# Patient Record
Sex: FEMALE | Race: WHITE | NOT HISPANIC OR LATINO | Employment: OTHER | URBAN - METROPOLITAN AREA
[De-identification: names, ages, dates, MRNs, and addresses within clinical notes are randomized per-mention and may not be internally consistent; named-entity substitution may affect disease eponyms.]

---

## 2017-03-07 ENCOUNTER — COMMUNICATION - HEALTHEAST (OUTPATIENT)
Dept: INTERNAL MEDICINE | Facility: CLINIC | Age: 77
End: 2017-03-07

## 2017-04-06 ENCOUNTER — OFFICE VISIT - HEALTHEAST (OUTPATIENT)
Dept: INTERNAL MEDICINE | Facility: CLINIC | Age: 77
End: 2017-04-06

## 2017-04-06 DIAGNOSIS — J47.9 BRONCHIECTASIS WITHOUT COMPLICATION (H): ICD-10-CM

## 2017-04-06 DIAGNOSIS — Z86.59 HISTORY OF SCHIZOPHRENIA: ICD-10-CM

## 2017-04-06 DIAGNOSIS — E55.9 VITAMIN D DEFICIENCY: ICD-10-CM

## 2017-04-06 DIAGNOSIS — E78.5 HYPERLIPEMIA: ICD-10-CM

## 2017-04-06 LAB — LDLC SERPL CALC-MCNC: 131 MG/DL

## 2017-04-06 ASSESSMENT — MIFFLIN-ST. JEOR: SCORE: 945.22

## 2017-04-08 ENCOUNTER — COMMUNICATION - HEALTHEAST (OUTPATIENT)
Dept: INTERNAL MEDICINE | Facility: CLINIC | Age: 77
End: 2017-04-08

## 2017-07-21 ENCOUNTER — COMMUNICATION - HEALTHEAST (OUTPATIENT)
Dept: INTERNAL MEDICINE | Facility: CLINIC | Age: 77
End: 2017-07-21

## 2017-09-05 ENCOUNTER — COMMUNICATION - HEALTHEAST (OUTPATIENT)
Dept: INTERNAL MEDICINE | Facility: CLINIC | Age: 77
End: 2017-09-05

## 2017-09-06 ENCOUNTER — COMMUNICATION - HEALTHEAST (OUTPATIENT)
Dept: INTERNAL MEDICINE | Facility: CLINIC | Age: 77
End: 2017-09-06

## 2017-09-06 DIAGNOSIS — R05.9 COUGH: ICD-10-CM

## 2017-09-22 ENCOUNTER — COMMUNICATION - HEALTHEAST (OUTPATIENT)
Dept: INTERNAL MEDICINE | Facility: CLINIC | Age: 77
End: 2017-09-22

## 2017-09-22 DIAGNOSIS — E78.5 HYPERLIPEMIA: ICD-10-CM

## 2017-09-22 DIAGNOSIS — E53.8 VITAMIN B12 DEFICIENCY: ICD-10-CM

## 2017-09-22 DIAGNOSIS — E55.9 VITAMIN D DEFICIENCY: ICD-10-CM

## 2017-09-29 ENCOUNTER — AMBULATORY - HEALTHEAST (OUTPATIENT)
Dept: LAB | Facility: CLINIC | Age: 77
End: 2017-09-29

## 2017-09-29 DIAGNOSIS — E53.8 VITAMIN B12 DEFICIENCY: ICD-10-CM

## 2017-09-29 DIAGNOSIS — E78.5 HYPERLIPEMIA: ICD-10-CM

## 2017-09-29 DIAGNOSIS — E55.9 VITAMIN D DEFICIENCY: ICD-10-CM

## 2017-09-29 LAB
CHOLEST SERPL-MCNC: 234 MG/DL
FASTING STATUS PATIENT QL REPORTED: YES
HDLC SERPL-MCNC: 69 MG/DL
LDLC SERPL CALC-MCNC: 140 MG/DL
TRIGL SERPL-MCNC: 127 MG/DL

## 2017-10-05 ENCOUNTER — OFFICE VISIT - HEALTHEAST (OUTPATIENT)
Dept: INTERNAL MEDICINE | Facility: CLINIC | Age: 77
End: 2017-10-05

## 2017-10-05 DIAGNOSIS — J47.9 BRONCHIECTASIS WITHOUT COMPLICATION (H): ICD-10-CM

## 2017-10-05 DIAGNOSIS — Z00.00 HEALTH MAINTENANCE EXAMINATION: ICD-10-CM

## 2017-10-05 DIAGNOSIS — E55.9 VITAMIN D DEFICIENCY: ICD-10-CM

## 2017-10-05 DIAGNOSIS — K64.9 HEMORRHOIDS: ICD-10-CM

## 2017-10-05 ASSESSMENT — MIFFLIN-ST. JEOR: SCORE: 938.75

## 2017-10-10 ENCOUNTER — COMMUNICATION - HEALTHEAST (OUTPATIENT)
Dept: INTERNAL MEDICINE | Facility: CLINIC | Age: 77
End: 2017-10-10

## 2017-10-10 ENCOUNTER — COMMUNICATION - HEALTHEAST (OUTPATIENT)
Dept: SCHEDULING | Facility: CLINIC | Age: 77
End: 2017-10-10

## 2017-10-10 DIAGNOSIS — K64.9 HEMORRHOIDS: ICD-10-CM

## 2017-10-10 DIAGNOSIS — K62.5 RECTAL BLEEDING: ICD-10-CM

## 2017-11-20 ENCOUNTER — COMMUNICATION - HEALTHEAST (OUTPATIENT)
Dept: INTERNAL MEDICINE | Facility: CLINIC | Age: 77
End: 2017-11-20

## 2017-11-20 DIAGNOSIS — F20.9 SCHIZOPHRENIA (H): ICD-10-CM

## 2017-11-29 ENCOUNTER — COMMUNICATION - HEALTHEAST (OUTPATIENT)
Dept: INTERNAL MEDICINE | Facility: CLINIC | Age: 77
End: 2017-11-29

## 2017-12-01 ENCOUNTER — OFFICE VISIT - HEALTHEAST (OUTPATIENT)
Dept: INTERNAL MEDICINE | Facility: CLINIC | Age: 77
End: 2017-12-01

## 2017-12-01 DIAGNOSIS — R07.9 CHEST PAIN: ICD-10-CM

## 2017-12-01 DIAGNOSIS — Z86.59 HISTORY OF SCHIZOPHRENIA: ICD-10-CM

## 2017-12-01 DIAGNOSIS — Z23 NEED FOR PNEUMOCOCCAL VACCINATION: ICD-10-CM

## 2017-12-01 DIAGNOSIS — J18.9 PNEUMONIA DUE TO INFECTIOUS ORGANISM, UNSPECIFIED LATERALITY, UNSPECIFIED PART OF LUNG: ICD-10-CM

## 2017-12-03 ENCOUNTER — COMMUNICATION - HEALTHEAST (OUTPATIENT)
Dept: INTERNAL MEDICINE | Facility: CLINIC | Age: 77
End: 2017-12-03

## 2017-12-05 ENCOUNTER — RECORDS - HEALTHEAST (OUTPATIENT)
Dept: ADMINISTRATIVE | Facility: OTHER | Age: 77
End: 2017-12-05

## 2018-01-29 ENCOUNTER — COMMUNICATION - HEALTHEAST (OUTPATIENT)
Dept: INTERNAL MEDICINE | Facility: CLINIC | Age: 78
End: 2018-01-29

## 2018-02-26 ENCOUNTER — OFFICE VISIT - HEALTHEAST (OUTPATIENT)
Dept: BEHAVIORAL HEALTH | Facility: CLINIC | Age: 78
End: 2018-02-26

## 2018-02-26 DIAGNOSIS — F20.9 SCHIZOPHRENIA, UNSPECIFIED TYPE (H): ICD-10-CM

## 2018-02-26 DIAGNOSIS — Z86.59 HISTORY OF SCHIZOPHRENIA: ICD-10-CM

## 2018-04-03 ENCOUNTER — OFFICE VISIT - HEALTHEAST (OUTPATIENT)
Dept: BEHAVIORAL HEALTH | Facility: CLINIC | Age: 78
End: 2018-04-03

## 2018-04-03 DIAGNOSIS — F20.0 PARANOID SCHIZOPHRENIA (H): ICD-10-CM

## 2018-04-03 ASSESSMENT — MIFFLIN-ST. JEOR: SCORE: 953.49

## 2018-05-09 ENCOUNTER — OFFICE VISIT - HEALTHEAST (OUTPATIENT)
Dept: INTERNAL MEDICINE | Facility: CLINIC | Age: 78
End: 2018-05-09

## 2018-05-09 DIAGNOSIS — J47.9 BRONCHIECTASIS WITHOUT COMPLICATION (H): ICD-10-CM

## 2018-05-09 DIAGNOSIS — F99 MENTAL HEALTH DISORDER: ICD-10-CM

## 2018-05-09 DIAGNOSIS — R53.1 ASTHENIA: ICD-10-CM

## 2018-05-16 ENCOUNTER — COMMUNICATION - HEALTHEAST (OUTPATIENT)
Dept: INTERNAL MEDICINE | Facility: CLINIC | Age: 78
End: 2018-05-16

## 2018-05-30 ENCOUNTER — COMMUNICATION - HEALTHEAST (OUTPATIENT)
Dept: INTERNAL MEDICINE | Facility: CLINIC | Age: 78
End: 2018-05-30

## 2018-07-05 ENCOUNTER — OFFICE VISIT - HEALTHEAST (OUTPATIENT)
Dept: BEHAVIORAL HEALTH | Facility: CLINIC | Age: 78
End: 2018-07-05

## 2018-07-05 DIAGNOSIS — F20.0 PARANOID SCHIZOPHRENIA (H): ICD-10-CM

## 2018-08-25 ENCOUNTER — COMMUNICATION - HEALTHEAST (OUTPATIENT)
Dept: INTERNAL MEDICINE | Facility: CLINIC | Age: 78
End: 2018-08-25

## 2018-08-25 DIAGNOSIS — J47.9 BRONCHIECTASIS WITHOUT COMPLICATION (H): ICD-10-CM

## 2018-12-13 ENCOUNTER — COMMUNICATION - HEALTHEAST (OUTPATIENT)
Dept: INTERNAL MEDICINE | Facility: CLINIC | Age: 78
End: 2018-12-13

## 2018-12-13 DIAGNOSIS — Z00.00 ANNUAL PHYSICAL EXAM: ICD-10-CM

## 2018-12-13 DIAGNOSIS — E55.9 VITAMIN D DEFICIENCY: ICD-10-CM

## 2018-12-13 DIAGNOSIS — E78.5 HYPERLIPEMIA: ICD-10-CM

## 2018-12-14 ENCOUNTER — OFFICE VISIT - HEALTHEAST (OUTPATIENT)
Dept: INTERNAL MEDICINE | Facility: CLINIC | Age: 78
End: 2018-12-14

## 2018-12-14 DIAGNOSIS — E55.9 VITAMIN D DEFICIENCY: ICD-10-CM

## 2018-12-14 DIAGNOSIS — Z00.00 HEALTH CARE MAINTENANCE: ICD-10-CM

## 2018-12-14 DIAGNOSIS — Z86.59 HISTORY OF SCHIZOPHRENIA: ICD-10-CM

## 2018-12-14 DIAGNOSIS — E78.5 HYPERLIPIDEMIA, UNSPECIFIED HYPERLIPIDEMIA TYPE: ICD-10-CM

## 2018-12-14 DIAGNOSIS — F99 MENTAL HEALTH DISORDER: ICD-10-CM

## 2018-12-14 LAB
ALBUMIN SERPL-MCNC: 4 G/DL (ref 3.5–5)
ALP SERPL-CCNC: 59 U/L (ref 45–120)
ALT SERPL W P-5'-P-CCNC: 19 U/L (ref 0–45)
ANION GAP SERPL CALCULATED.3IONS-SCNC: 7 MMOL/L (ref 5–18)
AST SERPL W P-5'-P-CCNC: 19 U/L (ref 0–40)
BILIRUB SERPL-MCNC: 0.4 MG/DL (ref 0–1)
BUN SERPL-MCNC: 15 MG/DL (ref 8–28)
CALCIUM SERPL-MCNC: 10.2 MG/DL (ref 8.5–10.5)
CHLORIDE BLD-SCNC: 104 MMOL/L (ref 98–107)
CHOLEST SERPL-MCNC: 219 MG/DL
CO2 SERPL-SCNC: 29 MMOL/L (ref 22–31)
CREAT SERPL-MCNC: 0.73 MG/DL (ref 0.6–1.1)
ERYTHROCYTE [DISTWIDTH] IN BLOOD BY AUTOMATED COUNT: 11.2 % (ref 11–14.5)
FASTING STATUS PATIENT QL REPORTED: NO
GFR SERPL CREATININE-BSD FRML MDRD: >60 ML/MIN/1.73M2
GLUCOSE BLD-MCNC: 81 MG/DL (ref 70–125)
HCT VFR BLD AUTO: 40.2 % (ref 35–47)
HDLC SERPL-MCNC: 68 MG/DL
HGB BLD-MCNC: 13.5 G/DL (ref 12–16)
LDLC SERPL CALC-MCNC: 137 MG/DL
MCH RBC QN AUTO: 30.6 PG (ref 27–34)
MCHC RBC AUTO-ENTMCNC: 33.6 G/DL (ref 32–36)
MCV RBC AUTO: 91 FL (ref 80–100)
PLATELET # BLD AUTO: 237 THOU/UL (ref 140–440)
PMV BLD AUTO: 7.4 FL (ref 7–10)
POTASSIUM BLD-SCNC: 4.7 MMOL/L (ref 3.5–5)
PROT SERPL-MCNC: 6.5 G/DL (ref 6–8)
RBC # BLD AUTO: 4.42 MILL/UL (ref 3.8–5.4)
SODIUM SERPL-SCNC: 140 MMOL/L (ref 136–145)
TRIGL SERPL-MCNC: 72 MG/DL
TSH SERPL DL<=0.005 MIU/L-ACNC: 1.1 UIU/ML (ref 0.3–5)
WBC: 5.2 THOU/UL (ref 4–11)

## 2018-12-14 ASSESSMENT — MIFFLIN-ST. JEOR: SCORE: 1023.81

## 2018-12-17 ENCOUNTER — COMMUNICATION - HEALTHEAST (OUTPATIENT)
Dept: INTERNAL MEDICINE | Facility: CLINIC | Age: 78
End: 2018-12-17

## 2018-12-17 LAB — 25(OH)D3 SERPL-MCNC: 54 NG/ML (ref 30–80)

## 2018-12-19 ENCOUNTER — COMMUNICATION - HEALTHEAST (OUTPATIENT)
Dept: INTERNAL MEDICINE | Facility: CLINIC | Age: 78
End: 2018-12-19

## 2018-12-19 DIAGNOSIS — J47.9 BRONCHIECTASIS WITHOUT COMPLICATION (H): ICD-10-CM

## 2019-01-03 ENCOUNTER — HOSPITAL ENCOUNTER (OUTPATIENT)
Dept: CARDIOLOGY | Facility: CLINIC | Age: 79
Discharge: HOME | End: 2019-01-03
Attending: NURSE PRACTITIONER

## 2019-01-03 ENCOUNTER — OFFICE VISIT - HEALTHEAST (OUTPATIENT)
Dept: BEHAVIORAL HEALTH | Facility: CLINIC | Age: 79
End: 2019-01-03

## 2019-01-03 DIAGNOSIS — Z86.59 HISTORY OF SCHIZOPHRENIA: ICD-10-CM

## 2019-01-03 LAB
ATRIAL RATE - MUSE: 84 BPM
DIASTOLIC BLOOD PRESSURE - MUSE: NORMAL MMHG
INTERPRETATION ECG - MUSE: NORMAL
P AXIS - MUSE: 71 DEGREES
PR INTERVAL - MUSE: 148 MS
QRS DURATION - MUSE: 72 MS
QT - MUSE: 352 MS
QTC - MUSE: 415 MS
R AXIS - MUSE: 18 DEGREES
SYSTOLIC BLOOD PRESSURE - MUSE: NORMAL MMHG
T AXIS - MUSE: 45 DEGREES
VENTRICULAR RATE- MUSE: 84 BPM

## 2019-01-07 ENCOUNTER — COMMUNICATION - HEALTHEAST (OUTPATIENT)
Dept: BEHAVIORAL HEALTH | Facility: CLINIC | Age: 79
End: 2019-01-07

## 2019-02-01 ENCOUNTER — COMMUNICATION - HEALTHEAST (OUTPATIENT)
Dept: INTERNAL MEDICINE | Facility: CLINIC | Age: 79
End: 2019-02-01

## 2019-02-14 ENCOUNTER — RECORDS - HEALTHEAST (OUTPATIENT)
Dept: ADMINISTRATIVE | Facility: OTHER | Age: 79
End: 2019-02-14

## 2019-02-21 ENCOUNTER — COMMUNICATION - HEALTHEAST (OUTPATIENT)
Dept: INTERNAL MEDICINE | Facility: CLINIC | Age: 79
End: 2019-02-21

## 2019-02-21 DIAGNOSIS — J32.9 SINUSITIS, UNSPECIFIED CHRONICITY, UNSPECIFIED LOCATION: ICD-10-CM

## 2019-03-12 ENCOUNTER — COMMUNICATION - HEALTHEAST (OUTPATIENT)
Dept: SCHEDULING | Facility: CLINIC | Age: 79
End: 2019-03-12

## 2019-03-13 ENCOUNTER — OFFICE VISIT - HEALTHEAST (OUTPATIENT)
Dept: INTERNAL MEDICINE | Facility: CLINIC | Age: 79
End: 2019-03-13

## 2019-03-13 DIAGNOSIS — Z13.820 SCREENING FOR OSTEOPOROSIS: ICD-10-CM

## 2019-03-13 DIAGNOSIS — J47.9 BRONCHIECTASIS WITHOUT COMPLICATION (H): ICD-10-CM

## 2019-03-13 DIAGNOSIS — J20.9 ACUTE BRONCHITIS, UNSPECIFIED ORGANISM: ICD-10-CM

## 2019-03-13 ASSESSMENT — MIFFLIN-ST. JEOR: SCORE: 1025.62

## 2019-04-19 ENCOUNTER — COMMUNICATION - HEALTHEAST (OUTPATIENT)
Dept: SCHEDULING | Facility: CLINIC | Age: 79
End: 2019-04-19

## 2019-04-22 ENCOUNTER — COMMUNICATION - HEALTHEAST (OUTPATIENT)
Dept: BEHAVIORAL HEALTH | Facility: CLINIC | Age: 79
End: 2019-04-22

## 2019-04-25 ENCOUNTER — COMMUNICATION - HEALTHEAST (OUTPATIENT)
Dept: BEHAVIORAL HEALTH | Facility: CLINIC | Age: 79
End: 2019-04-25

## 2019-04-26 ENCOUNTER — OFFICE VISIT - HEALTHEAST (OUTPATIENT)
Dept: BEHAVIORAL HEALTH | Facility: CLINIC | Age: 79
End: 2019-04-26

## 2019-04-26 DIAGNOSIS — F20.0 PARANOID SCHIZOPHRENIA (H): ICD-10-CM

## 2019-04-26 DIAGNOSIS — Z86.59 HISTORY OF SCHIZOPHRENIA: ICD-10-CM

## 2019-04-26 ASSESSMENT — MIFFLIN-ST. JEOR: SCORE: 1037.41

## 2019-05-08 ENCOUNTER — COMMUNICATION - HEALTHEAST (OUTPATIENT)
Dept: BEHAVIORAL HEALTH | Facility: CLINIC | Age: 79
End: 2019-05-08

## 2019-05-08 ENCOUNTER — OFFICE VISIT - HEALTHEAST (OUTPATIENT)
Dept: INTERNAL MEDICINE | Facility: CLINIC | Age: 79
End: 2019-05-08

## 2019-05-08 DIAGNOSIS — R22.1 LUMP IN NECK: ICD-10-CM

## 2019-05-08 DIAGNOSIS — M81.0 OSTEOPOROSIS, UNSPECIFIED OSTEOPOROSIS TYPE, UNSPECIFIED PATHOLOGICAL FRACTURE PRESENCE: ICD-10-CM

## 2019-05-08 DIAGNOSIS — Z86.59 HISTORY OF SCHIZOPHRENIA: ICD-10-CM

## 2019-05-08 DIAGNOSIS — J47.9 BRONCHIECTASIS WITHOUT ACUTE EXACERBATION (H): ICD-10-CM

## 2019-05-08 ASSESSMENT — MIFFLIN-ST. JEOR: SCORE: 1019.27

## 2019-07-22 ENCOUNTER — HOSPITAL ENCOUNTER (OUTPATIENT)
Dept: CT IMAGING | Facility: CLINIC | Age: 79
Discharge: HOME | End: 2019-07-22
Attending: INTERNAL MEDICINE

## 2019-07-22 ENCOUNTER — OFFICE VISIT - HEALTHEAST (OUTPATIENT)
Dept: INTERNAL MEDICINE | Facility: CLINIC | Age: 79
End: 2019-07-22

## 2019-07-22 DIAGNOSIS — R22.1 NECK MASS: ICD-10-CM

## 2019-07-22 LAB
CREAT BLD-MCNC: 0.8 MG/DL (ref 0.6–1.1)
GFR SERPL CREATININE-BSD FRML MDRD: >60 ML/MIN/1.73M2

## 2019-07-22 ASSESSMENT — MIFFLIN-ST. JEOR: SCORE: 1019.27

## 2019-07-30 ENCOUNTER — OFFICE VISIT - HEALTHEAST (OUTPATIENT)
Dept: OTOLARYNGOLOGY | Facility: CLINIC | Age: 79
End: 2019-07-30

## 2019-07-30 DIAGNOSIS — R22.1 NECK MASS: ICD-10-CM

## 2019-09-10 ENCOUNTER — OFFICE VISIT - HEALTHEAST (OUTPATIENT)
Dept: BEHAVIORAL HEALTH | Facility: CLINIC | Age: 79
End: 2019-09-10

## 2019-09-10 DIAGNOSIS — F20.0 PARANOID SCHIZOPHRENIA (H): ICD-10-CM

## 2019-09-28 ENCOUNTER — COMMUNICATION - HEALTHEAST (OUTPATIENT)
Dept: SCHEDULING | Facility: CLINIC | Age: 79
End: 2019-09-28

## 2019-09-28 ENCOUNTER — COMMUNICATION - HEALTHEAST (OUTPATIENT)
Dept: PEDIATRICS | Facility: CLINIC | Age: 79
End: 2019-09-28

## 2019-09-28 DIAGNOSIS — J47.9 BRONCHIECTASIS WITHOUT COMPLICATION (H): ICD-10-CM

## 2019-10-17 ENCOUNTER — OFFICE VISIT - HEALTHEAST (OUTPATIENT)
Dept: INTERNAL MEDICINE | Facility: CLINIC | Age: 79
End: 2019-10-17

## 2019-10-17 DIAGNOSIS — M81.0 AGE RELATED OSTEOPOROSIS, UNSPECIFIED PATHOLOGICAL FRACTURE PRESENCE: ICD-10-CM

## 2019-10-17 DIAGNOSIS — J47.9 BRONCHIECTASIS WITHOUT ACUTE EXACERBATION (H): ICD-10-CM

## 2019-10-17 DIAGNOSIS — Z12.11 SCREENING FOR COLON CANCER: ICD-10-CM

## 2019-10-17 ASSESSMENT — MIFFLIN-ST. JEOR: SCORE: 1010.19

## 2019-10-28 ENCOUNTER — RECORDS - HEALTHEAST (OUTPATIENT)
Dept: ADMINISTRATIVE | Facility: OTHER | Age: 79
End: 2019-10-28

## 2019-10-28 LAB — COLOGUARD-ABSTRACT: NEGATIVE

## 2019-11-08 ENCOUNTER — RECORDS - HEALTHEAST (OUTPATIENT)
Dept: HEALTH INFORMATION MANAGEMENT | Facility: CLINIC | Age: 79
End: 2019-11-08

## 2019-11-19 ENCOUNTER — AMBULATORY - HEALTHEAST (OUTPATIENT)
Dept: BEHAVIORAL HEALTH | Facility: CLINIC | Age: 79
End: 2019-11-19

## 2019-11-19 ENCOUNTER — RECORDS - HEALTHEAST (OUTPATIENT)
Dept: BONE DENSITY | Facility: CLINIC | Age: 79
End: 2019-11-19

## 2019-11-19 DIAGNOSIS — M81.0 AGE-RELATED OSTEOPOROSIS WITHOUT CURRENT PATHOLOGICAL FRACTURE: ICD-10-CM

## 2019-11-19 DIAGNOSIS — Z86.59 HISTORY OF SCHIZOPHRENIA: ICD-10-CM

## 2019-12-02 ENCOUNTER — COMMUNICATION - HEALTHEAST (OUTPATIENT)
Dept: INTERNAL MEDICINE | Facility: CLINIC | Age: 79
End: 2019-12-02

## 2020-01-17 ENCOUNTER — COMMUNICATION - HEALTHEAST (OUTPATIENT)
Dept: BEHAVIORAL HEALTH | Facility: CLINIC | Age: 80
End: 2020-01-17

## 2020-01-21 ENCOUNTER — AMBULATORY - HEALTHEAST (OUTPATIENT)
Dept: BEHAVIORAL HEALTH | Facility: CLINIC | Age: 80
End: 2020-01-21

## 2020-01-21 DIAGNOSIS — F20.0 PARANOID SCHIZOPHRENIA (H): ICD-10-CM

## 2020-02-04 ENCOUNTER — OFFICE VISIT - HEALTHEAST (OUTPATIENT)
Dept: BEHAVIORAL HEALTH | Facility: CLINIC | Age: 80
End: 2020-02-04

## 2020-02-04 DIAGNOSIS — F20.0 PARANOID SCHIZOPHRENIA (H): ICD-10-CM

## 2020-02-04 ASSESSMENT — MIFFLIN-ST. JEOR: SCORE: 1030.61

## 2020-02-14 ENCOUNTER — COMMUNICATION - HEALTHEAST (OUTPATIENT)
Dept: BEHAVIORAL HEALTH | Facility: CLINIC | Age: 80
End: 2020-02-14

## 2020-02-14 DIAGNOSIS — F20.0 PARANOID SCHIZOPHRENIA (H): ICD-10-CM

## 2020-03-23 ENCOUNTER — COMMUNICATION - HEALTHEAST (OUTPATIENT)
Dept: BEHAVIORAL HEALTH | Facility: CLINIC | Age: 80
End: 2020-03-23

## 2020-03-23 DIAGNOSIS — F20.0 PARANOID SCHIZOPHRENIA (H): ICD-10-CM

## 2020-05-26 ENCOUNTER — COMMUNICATION - HEALTHEAST (OUTPATIENT)
Dept: INTERNAL MEDICINE | Facility: CLINIC | Age: 80
End: 2020-05-26

## 2020-05-26 DIAGNOSIS — J47.9 BRONCHIECTASIS WITHOUT ACUTE EXACERBATION (H): ICD-10-CM

## 2020-06-02 ENCOUNTER — OFFICE VISIT - HEALTHEAST (OUTPATIENT)
Dept: BEHAVIORAL HEALTH | Facility: CLINIC | Age: 80
End: 2020-06-02

## 2020-06-02 DIAGNOSIS — Z86.59 HISTORY OF SCHIZOPHRENIA: ICD-10-CM

## 2020-07-21 ENCOUNTER — OFFICE VISIT - HEALTHEAST (OUTPATIENT)
Dept: INTERNAL MEDICINE | Facility: CLINIC | Age: 80
End: 2020-07-21

## 2020-07-21 DIAGNOSIS — J47.9 BRONCHIECTASIS WITHOUT ACUTE EXACERBATION (H): ICD-10-CM

## 2020-08-24 ENCOUNTER — COMMUNICATION - HEALTHEAST (OUTPATIENT)
Dept: BEHAVIORAL HEALTH | Facility: CLINIC | Age: 80
End: 2020-08-24

## 2020-08-24 DIAGNOSIS — F20.0 PARANOID SCHIZOPHRENIA (H): ICD-10-CM

## 2020-09-17 ENCOUNTER — TRANSFERRED RECORDS (OUTPATIENT)
Dept: HEALTH INFORMATION MANAGEMENT | Facility: CLINIC | Age: 80
End: 2020-09-17

## 2020-09-22 ENCOUNTER — OFFICE VISIT - HEALTHEAST (OUTPATIENT)
Dept: INTERNAL MEDICINE | Facility: CLINIC | Age: 80
End: 2020-09-22

## 2020-09-22 DIAGNOSIS — A31.0 MYCOBACTERIUM AVIUM INFECTION (H): ICD-10-CM

## 2020-09-22 DIAGNOSIS — Z00.00 ROUTINE GENERAL MEDICAL EXAMINATION AT A HEALTH CARE FACILITY: ICD-10-CM

## 2020-09-22 DIAGNOSIS — H53.9 VISUAL DISTURBANCE: ICD-10-CM

## 2020-09-22 DIAGNOSIS — R76.11 MANTOUX: POSITIVE: ICD-10-CM

## 2020-09-22 DIAGNOSIS — M81.0 OSTEOPOROSIS, UNSPECIFIED OSTEOPOROSIS TYPE, UNSPECIFIED PATHOLOGICAL FRACTURE PRESENCE: ICD-10-CM

## 2020-09-22 DIAGNOSIS — J47.9 BRONCHIECTASIS WITHOUT COMPLICATION (H): ICD-10-CM

## 2020-09-22 DIAGNOSIS — Z13.220 SCREENING FOR HYPERLIPIDEMIA: ICD-10-CM

## 2020-09-22 LAB
ALBUMIN SERPL-MCNC: 3.9 G/DL (ref 3.5–5)
ALP SERPL-CCNC: 61 U/L (ref 45–120)
ALT SERPL W P-5'-P-CCNC: 18 U/L (ref 0–45)
ANION GAP SERPL CALCULATED.3IONS-SCNC: 6 MMOL/L (ref 5–18)
AST SERPL W P-5'-P-CCNC: 20 U/L (ref 0–40)
BASOPHILS # BLD AUTO: 0 THOU/UL (ref 0–0.2)
BASOPHILS NFR BLD AUTO: 1 % (ref 0–2)
BILIRUB SERPL-MCNC: 0.4 MG/DL (ref 0–1)
BUN SERPL-MCNC: 11 MG/DL (ref 8–28)
CALCIUM SERPL-MCNC: 9.3 MG/DL (ref 8.5–10.5)
CHLORIDE BLD-SCNC: 104 MMOL/L (ref 98–107)
CHOLEST SERPL-MCNC: 230 MG/DL
CO2 SERPL-SCNC: 29 MMOL/L (ref 22–31)
CREAT SERPL-MCNC: 0.77 MG/DL (ref 0.6–1.1)
EOSINOPHIL # BLD AUTO: 0.1 THOU/UL (ref 0–0.4)
EOSINOPHIL NFR BLD AUTO: 2 % (ref 0–6)
ERYTHROCYTE [DISTWIDTH] IN BLOOD BY AUTOMATED COUNT: 11.5 % (ref 11–14.5)
FASTING STATUS PATIENT QL REPORTED: YES
GFR SERPL CREATININE-BSD FRML MDRD: >60 ML/MIN/1.73M2
GLUCOSE BLD-MCNC: 100 MG/DL (ref 70–125)
HCT VFR BLD AUTO: 40.4 % (ref 35–47)
HDLC SERPL-MCNC: 56 MG/DL
HGB BLD-MCNC: 13.6 G/DL (ref 12–16)
LDLC SERPL CALC-MCNC: 155 MG/DL
LYMPHOCYTES # BLD AUTO: 1.4 THOU/UL (ref 0.8–4.4)
LYMPHOCYTES NFR BLD AUTO: 28 % (ref 20–40)
MCH RBC QN AUTO: 31 PG (ref 27–34)
MCHC RBC AUTO-ENTMCNC: 33.7 G/DL (ref 32–36)
MCV RBC AUTO: 92 FL (ref 80–100)
MONOCYTES # BLD AUTO: 0.3 THOU/UL (ref 0–0.9)
MONOCYTES NFR BLD AUTO: 6 % (ref 2–10)
NEUTROPHILS # BLD AUTO: 3.3 THOU/UL (ref 2–7.7)
NEUTROPHILS NFR BLD AUTO: 64 % (ref 50–70)
PLATELET # BLD AUTO: 268 THOU/UL (ref 140–440)
PMV BLD AUTO: 7.6 FL (ref 7–10)
POTASSIUM BLD-SCNC: 4.3 MMOL/L (ref 3.5–5)
PROT SERPL-MCNC: 6.6 G/DL (ref 6–8)
RBC # BLD AUTO: 4.39 MILL/UL (ref 3.8–5.4)
SODIUM SERPL-SCNC: 139 MMOL/L (ref 136–145)
TRIGL SERPL-MCNC: 95 MG/DL
WBC: 5.1 THOU/UL (ref 4–11)

## 2020-09-22 ASSESSMENT — MIFFLIN-ST. JEOR: SCORE: 1023.81

## 2020-09-23 ENCOUNTER — COMMUNICATION - HEALTHEAST (OUTPATIENT)
Dept: INTERNAL MEDICINE | Facility: CLINIC | Age: 80
End: 2020-09-23

## 2020-10-06 ENCOUNTER — OFFICE VISIT - HEALTHEAST (OUTPATIENT)
Dept: BEHAVIORAL HEALTH | Facility: CLINIC | Age: 80
End: 2020-10-06

## 2020-10-06 DIAGNOSIS — F20.0 PARANOID SCHIZOPHRENIA (H): ICD-10-CM

## 2020-10-21 ENCOUNTER — ANCILLARY PROCEDURE (OUTPATIENT)
Dept: MRI IMAGING | Facility: CLINIC | Age: 80
End: 2020-10-21
Payer: MEDICARE

## 2020-10-21 DIAGNOSIS — H52.7 REFRACTIVE ERROR: ICD-10-CM

## 2020-10-21 DIAGNOSIS — H43.393 VITREOUS FLOATER, BILATERAL: ICD-10-CM

## 2020-10-21 PROCEDURE — 70553 MRI BRAIN STEM W/O & W/DYE: CPT | Performed by: RADIOLOGY

## 2020-10-21 PROCEDURE — A9585 GADOBUTROL INJECTION: HCPCS | Performed by: RADIOLOGY

## 2020-10-21 PROCEDURE — 70543 MRI ORBT/FAC/NCK W/O &W/DYE: CPT | Performed by: RADIOLOGY

## 2020-10-21 PROCEDURE — 99207 MRV BRAIN  WO & W CONTRAST: CPT | Performed by: RADIOLOGY

## 2020-10-21 RX ORDER — GADOBUTROL 604.72 MG/ML
7.5 INJECTION INTRAVENOUS ONCE
Status: COMPLETED | OUTPATIENT
Start: 2020-10-21 | End: 2020-10-21

## 2020-10-21 RX ADMIN — GADOBUTROL 7.5 ML: 604.72 INJECTION INTRAVENOUS at 11:20

## 2020-10-21 NOTE — DISCHARGE INSTRUCTIONS
MRI Contrast Discharge Instructions    The IV contrast you received today will pass out of your body in your  urine. This will happen in the next 24 hours. You will not feel this process.  Your urine will not change color.    Drink at least 4 extra glasses of water or juice today (unless your doctor  has restricted your fluids). This reduces the stress on your kidneys.  You may take your regular medicines.    If you are on dialysis: It is best to have dialysis today.    If you have a reaction: Most reactions happen right away. If you have  any new symptoms after leaving the hospital (such as hives or swelling),  call your hospital at the correct number below. Or call your family doctor.  If you have breathing distress or wheezing, call 911.    Special instructions: ***    I have read and understand the above information.    Signature:______________________________________ Date:___________    Staff:__________________________________________ Date:___________     Time:__________    [facility] Departments:    ___[place]: [phone]  ___[facility]: [phone]  ___[place]: [phone] ___[facility]: [phone]  ___[facility]: [phone]  ___[facility]: [phone]  ___[phone]  ___[facility]: [phone]  ___[phone]

## 2021-01-05 ENCOUNTER — OFFICE VISIT - HEALTHEAST (OUTPATIENT)
Dept: BEHAVIORAL HEALTH | Facility: CLINIC | Age: 81
End: 2021-01-05

## 2021-01-05 DIAGNOSIS — Z86.59 HISTORY OF SCHIZOPHRENIA: ICD-10-CM

## 2021-01-05 DIAGNOSIS — F20.0 PARANOID SCHIZOPHRENIA (H): ICD-10-CM

## 2021-01-11 ENCOUNTER — COMMUNICATION - HEALTHEAST (OUTPATIENT)
Dept: BEHAVIORAL HEALTH | Facility: CLINIC | Age: 81
End: 2021-01-11

## 2021-04-06 ENCOUNTER — OFFICE VISIT - HEALTHEAST (OUTPATIENT)
Dept: BEHAVIORAL HEALTH | Facility: CLINIC | Age: 81
End: 2021-04-06

## 2021-04-06 DIAGNOSIS — F43.22 ADJUSTMENT DISORDER WITH ANXIETY: ICD-10-CM

## 2021-04-06 DIAGNOSIS — Z79.899 HIGH RISK MEDICATION USE: ICD-10-CM

## 2021-04-06 DIAGNOSIS — Z86.59 HISTORY OF SCHIZOPHRENIA: ICD-10-CM

## 2021-04-06 DIAGNOSIS — H53.9 VISUAL DISTURBANCE: ICD-10-CM

## 2021-04-06 NOTE — ASSESSMENT & PLAN NOTE
"Family pressuring her to take care of glaucoma.  Had stressful 9 months with previous ladies. Current ladies are \"sweet\".  Moving out of [facility] [address] late May. Has own apt.  Reports mood stable.   "

## 2021-04-06 NOTE — ASSESSMENT & PLAN NOTE
"Pt reports: \"In Dec, God told me:  'Do not get treatment on your eyes'.   He wants me to go blind.  He is going to give me a gift.  I have glaucoma, but will not get it treated.\"  "

## 2021-04-06 NOTE — ASSESSMENT & PLAN NOTE
The risks and benefits of medication discussed with the pt including metabolic s/e's, TD, CV risk.  Today, pt endorsed involuntary movements of the mouth.  Says they are minimal.  She will ask her daughter to evaluate. Schedule in person DISCUS.

## 2021-04-12 ENCOUNTER — COMMUNICATION - HEALTHEAST (OUTPATIENT)
Dept: BEHAVIORAL HEALTH | Facility: CLINIC | Age: 81
End: 2021-04-12

## 2021-04-15 ENCOUNTER — COMMUNICATION - HEALTHEAST (OUTPATIENT)
Dept: BEHAVIORAL HEALTH | Facility: CLINIC | Age: 81
End: 2021-04-15

## 2021-04-19 ENCOUNTER — COMMUNICATION - HEALTHEAST (OUTPATIENT)
Dept: BEHAVIORAL HEALTH | Facility: CLINIC | Age: 81
End: 2021-04-19

## 2021-04-26 ENCOUNTER — COMMUNICATION - HEALTHEAST (OUTPATIENT)
Dept: BEHAVIORAL HEALTH | Facility: CLINIC | Age: 81
End: 2021-04-26

## 2021-04-27 ENCOUNTER — COMMUNICATION - HEALTHEAST (OUTPATIENT)
Dept: BEHAVIORAL HEALTH | Facility: CLINIC | Age: 81
End: 2021-04-27

## 2021-04-27 DIAGNOSIS — F20.0 PARANOID SCHIZOPHRENIA (H): ICD-10-CM

## 2021-05-28 NOTE — TELEPHONE ENCOUNTER
"PATIENT RETURNING OUR CALL REQUESTING TO SET APPOINTMENT WITH DR TORO BECAUSE OF   EMMANUEL MICHELLE LEAVING OUR CLINIC.  PATIENT REFUSING TO SET APPOINTMENT, STATING \"I WILL CALL WHEN I HAVE A NEED\".  THIS CALLER ENCOURAGED HER TO SET UP AN APPOINTMENT TO INSURE GETTING ON PROVIDER'S SCHEDULE.  PATIENT AGAIN STATES SHE WILL CALL BACK \"WHEN SHE FEELS THE NEED\"  "

## 2021-05-28 NOTE — PROGRESS NOTES
" ASSESSMENT AND PLAN:    1. Bronchiectasis without acute exacerbation (H)  She will get exacerbations about 3 times per year.  Will have on hand antibiotic therapy to take at onset of sputum change.  Will vary antibiotic used to avoid developing resistence.   - cephalexin (KEFLEX) 250 MG capsule; Take 1 capsule (250 mg total) by mouth 4 (four) times a day for 10 days.  Dispense: 40 capsule; Refill: 0    2. Lump in neck  Exam today does not reveal significant mass or 'lump'.  There is a left prominent carotid pulse, but no mass or lymphadenopathy.  Reassured.  Follow up prn.      3. History of schizophrenia  Long standing diagnosis.  Started on loxipine and is followed by psychiatry.       4. Screening for osteoporosis.   She is agreeable to having DEXA again, wants to wait until PE later this year.  Previous DEXA confirmed osteoporosis.  She is asymptomatic, on calcium with vitamin D.  May need to add olendronate if there is progression.     Patient Instructions   1. Discussed that exam does not reveal cervical mass at this time.  Follow and be seen if one develops.    2. Discussed preventive services.  Follow up in the fall for PE, she agrees to consider repeat DEXA at that time.     3. Continue calcium and vitamin D and maintain activity.     CHIEF COMPLAINT:  Chief Complaint   Patient presents with     Mass     Noticed a week ago. \"Lump on my neck\" Denied pain     HISTORY OF PRESENT ILLNESS:  Neisha Malcolm is a 78 y.o. female with concern about possible neck 'lump' noted by her dentist.  No dental infection was noted.  She feels well.  Denies unusual back pain, or fever, or chills.  Her weight is actually up from one year ago.  On new anti-psychotic medication, loxitine, for history of schizophrenia.  No recent confusion, hallucinations, or unusual behavior, daughter confirms.  Recent exacerbation of chronic bronchiectasis responded well to cephradine.  It didn't respond well to azithromycin.  She is now back " "to baseline with clear sputum and chronic cough.  She gets about 3 exacerbations per year.      REVIEW OF SYSTEMS:   See HPI, all other systems on review are negative.    Past Medical History:   Diagnosis Date     Asthenia      Weight loss      Social History     Tobacco Use   Smoking Status Never Smoker   Smokeless Tobacco Never Used     Family History   Problem Relation Age of Onset     Cancer Mother      Hypertension Mother      Stroke Father      Past Surgical History:   Procedure Laterality Date     APPENDECTOMY       DILATION AND CURETTAGE OF UTERUS       HYSTERECTOMY      unilateral oophorectomy     VAGINAL DELIVERY  1963     x 2; son and daughter; '63 and '66     VITALS:  Vitals:    05/08/19 0830   BP: 110/60   Patient Site: Left Arm   Patient Position: Sitting   Cuff Size: Adult Regular   Pulse: 84   SpO2: 98%   Weight: 129 lb (58.5 kg)   Height: 5' 3\" (1.6 m)     Wt Readings from Last 3 Encounters:   05/08/19 129 lb (58.5 kg)   04/26/19 133 lb (60.3 kg)   03/13/19 130 lb 6.4 oz (59.1 kg)     PHYSICAL EXAM:  Constitutional:  In NAD, alert and oriented  HEENT: nose and throat clear, ears normal  Neck: no significant adenopathy, no mass, thyroid is not enlarged. The left carotid pulsation is more prominent on the left, without bruit, or tenderness to palpation.   Cardiac:  S1 S2 without murmur  Lungs: Clear to auscultation  Abdomen:   Soft, flat and non-tender, without guarding, rebound, or mass.      Psychiatric:  Mood appropriate, memory is good, thinking is clear.     DECISION TO OBTAIN OLD RECORDS AND/OR OBTAIN HISTORY FROM SOMEONE OTHER THAN PATIENT, AND/OR ACCESSING CARE EVERYWHERE):  1  0    REVIEW AND SUMMARIZATION OF OLD RECORDS, AND/OR OBTAINING HISTORY FROM SOMEONE OTHER THAN PATIENT, AND/OR DISCUSSION OF CASE WITH ANOTHER HEALTH CARE PROVIDER:  2 reviewed recent 3/13/ visit.     REVIEW AND/OR ORDER OF OF CLINICAL LAB TESTS: 1 0    REVIEW AND/OR ORDER OF RADIOLOGY TESTS: 1 reviewed 2016 " DEXA.    REVIEW AND/OR ORDER OF MEDICAL TESTS (EKG/ECHO/COLONOSCOPY/EGD): 1  0    INDEPENDENT  VISUALIZATION OF IMAGE, TRACING, OR SPECIMEN ITSELF (2 EACH):  0    TOTAL: 3    Current Outpatient Medications   Medication Sig Dispense Refill     ascorbic acid, vitamin C, (VITAMIN C) 100 MG tablet Take 100 mg by mouth daily.       b complex vitamins tablet Take 1 tablet by mouth daily.       calcium, as carbonate, (TUMS) 200 mg calcium (500 mg) chewable tablet Chew 1 tablet daily.       cholecalciferol, vitamin D3, (VITAMIN D3 ORAL) Take by mouth.       ferrous sulfate 325 (65 FE) MG tablet Take 1 tablet by mouth daily with breakfast.       GINSENG ORAL Take by mouth 3 (three) times a week.        loxapine (LOXITANE) 10 MG capsule Take 1 capsule (10 mg total) by mouth at bedtime. 30 capsule 6     multivitamin therapeutic tablet Take 1 tablet by mouth daily.       vitamin A 8000 UNIT capsule Take 8,000 Units by mouth daily.       vitamin E 200 UNIT capsule Take 200 Units by mouth daily.       cephalexin (KEFLEX) 250 MG capsule Take 1 capsule (250 mg total) by mouth 4 (four) times a day for 10 days. 40 capsule 0     No current facility-administered medications for this visit.      Mukund Martin MD  Internal Medicine  [facility]

## 2021-05-28 NOTE — PROGRESS NOTES
Correct pharmacy verified with patient and confirmed in snapshot? [x] yes []no    Charge captured ? [x] yes  [] no    Medications Phoned  to Pharmacy [] yes [x]no  Name of Pharmacist:  List Medications, including dose, quantity and instructions      Medication Prescriptions given to patient   [] yes  [x] no   List the name of the drug the prescription was written for.       Medications ordered this visit were e-scribed.  Verified by order class [x] yes  [] no mellaril 10mg, loxitane 10mg    Medication changes or discontinuations were communicated to patient's pharmacy: [] yes  [x] no    UA collected [] yes  [x] no    [place] Prescription Monitoring Program Reviewed? [] yes  [x] no no access    Referrals were made to:   none    Future appointment was made: [x] yes  [] no    Dictation completed at time of chart check: [x] yes  [] no    I have checked the documentation for today s encounters and the above information has been reviewed and completed.

## 2021-05-28 NOTE — TELEPHONE ENCOUNTER
Most probably a neck or nerve issue    Ok to have Dr Martin see this next week and decide    No ER is needed

## 2021-05-28 NOTE — TELEPHONE ENCOUNTER
Patient called to say that her pharmacy was out of thioridazine. She says that she was told that the  was not making any until July. Pharmacy was called and asked for clarification. Pharmacist confirmed that this medication, in all doses and forms, is on back order.     Patient is asking to be put on another medication. She currently has a 3 day supply.

## 2021-05-28 NOTE — TELEPHONE ENCOUNTER
"Pt calling  [phone]    Pt calling to schedule an appt with Dr Martin.  Pt states for the past 6 weeks she has been experiencing intermittent bilateral arm numbness and constant upper lip numbness for the past 6 weeks.  She feels her arm numbness in the morning when she first wakes up and numbness will go away.  Lip numbness is constant and Pt states it is more on the upper left side of her lips.    Denies any loss of speech, garbled speech, headache, dizziness  or confusion.    PLAN  Advised Pt, per protocol she should be seen right away in ED..  Pt states she \"only wants to see Dr Martin\".  PCP's first available is on 4/25/19.  Pt states she wants to wait until then to be seen.  Will transfer to scheduling to schedule an appt.  Discussed Sx to be aware of that recommend she is seen in ED.  Dulce Florence, RN, [facility] RN Triage/Med Refills      Reason for Disposition    Numbness or tingling in one or both hands is a chronic symptom (recurrent or ongoing problem lasting > 4 weeks)    Neurologic deficit that was brief (now gone), ANY of the following: * Weakness of the face, arm, or leg on one side of the body * Numbness of the face, arm, or leg on one side of the body * Loss of speech or garbled speech    Protocols used: NEUROLOGIC DEFICIT-A-OH      "

## 2021-05-28 NOTE — TELEPHONE ENCOUNTER
Patient has poor insight into her disease process has a long history of bipolar most recent manic had been started on Zyprexa had decided to not stay on the medication patient had chose to start on Mellaril as she had had that many years ago and was on a very low dose now the pharmacy is unable to obtain the medication    The provider has requested that the patient come in to discuss other options  Daughter was called message was left to assist in getting her into the clinic as the patient has decided to stop medication at this time    Encouraged the daughter to assist provider in setting up an appointment to discuss other options

## 2021-05-28 NOTE — PATIENT INSTRUCTIONS - HE
1. Discussed that exam does not reveal cervical mass at this time.  Follow and be seen if one develops.    2. Discussed preventive services.  Follow up in the fall for PE, she agrees to consider repeat DEXA at that time.     3. Continue calcium and vitamin D and maintain activity.

## 2021-05-28 NOTE — PROGRESS NOTES
Last visit [place]:1/3/19  Have you seen your PCP: 3/13/19   What medical conditions do we need to know about: no  Are you seeing a therapist: no  Are you taking your medications: yes  Any concerns about your sleep: no  Any concerns about your Appetite: no  How is your Mood: good  Any Pain scale 0-10, ten being the worst: 0   Any Anxiety scale 0-5, five being the worst: 0   Any Depression scale 0-5, five being the worst: 0  Any Suicidal or homicidal ideation: denies

## 2021-05-28 NOTE — TELEPHONE ENCOUNTER
Phone call to pt.  She took her last dose of Mellaril today.  She has called [facility], [facility] and [facility] pharmacies to see if they carry Mellaril and none of them did.  Told her Agustina NP needs to see pt. In clinic to change her medication.  Pt. Declines to make a clinic appointment before her next scheduled on 6/6/19. Pt.'s plan is to stop taking the Mellaril. Reports she will call the clinic if she begins to feel unwell or other symptoms.    I called [facility] and [facility] pharmacies to see if they had or could obtain Mellaril 10mg.  Neither had nor could order the medication.

## 2021-05-30 VITALS — WEIGHT: 115.3 LBS | HEIGHT: 62 IN | BODY MASS INDEX: 21.22 KG/M2

## 2021-05-30 NOTE — PROGRESS NOTES
HPI: This patient is a 80yo F who presents to clinic for evaluation of a neck mass at the request of Dr. Martin. She had never noticed anything, but her dentist told her that she had a mass in her neck several months ago. At a follow-up visit with the dentist, he said it was still there.  Denies pain, fevers, weight loss, odynophagia, dysphagia, hemoptysis and other symptoms. Her PCP ordered a CT scan of her neck that was done 8 days ago. She has not received a call to inform her that the scan is normal; she is here in the ENT clinic to obtain her scan results.     Past medical history, surgical history, social history, family history, medications, and allergies have been reviewed with the patient and are documented above.    Review of Systems: a 10-system review was performed. Pertinent positives are noted in the HPI and on a separate scanned document in the chart.    PHYSICAL EXAMINATION:  GEN: no acute distress, normocephalic  EYES: extraocular movements are intact, pupils are equal and round. Sclera clear.   EARS: auricles are normally formed. The external auditory canals are clear with minimal to no cerumen. Tympanic membranes are intact bilaterally with no signs of infection, effusion, retractions, or perforations.  NOSE: anterior nares are patent.   OC/OP: clear, dentition is appropriate. The tongue and palate are fully mobile and symmetric. No masses or lesions  NECK: soft and supple. No lymphadenopathy or masses. Airway is midline. The mass is in question is her normal carotid bulb.  NEURO: CN VII and XII symmetric. alert and oriented. No spontaneous nystagmus. Gait is normal.  PULM: breathing comfortably on room air, normal chest expansion with respiration  CARDS: no cyanosis or clubbing, normal carotid pulses    CT NECK:  CONCLUSION:   1.  No mass, pathologic cervical lymph nodes, or localized inflammation identified.    MEDICAL DECISION-MAKING: This patient is a 80yo F whose dentist mistook her normal  carotid bulb for a neck mass. The CT scan is normal, her exam is normal. She was not notified of her normal scan prior to today's visit. Reassured her and her daughter that there is nothing of concern.

## 2021-05-30 NOTE — PROGRESS NOTES
" ASSESSMENT AND PLAN:    1. Neck mass  Again, I don't find a palpable mass of the left neck.  There is a modestly prominent left upper carotid pulsation.  Her dentist is concerned.  Will get CT neck, and refer to ENT.  Reassured.    - CT Soft Tissue Neck With Contrast; Future  - Ambulatory referral to ENT      Patient Instructions   1. Reassured about the negative exam    2. Get CT neck and ENT evaluation.     CHIEF COMPLAINT:  Chief Complaint   Patient presents with     Mass     neck     HISTORY OF PRESENT ILLNESS:  Neisha Malcolm is a 79 y.o. female returns that her dentist continues to feel a 'lymph node' in her left upper neck.  She is asymptomatic and feels well.  No fever, or cough, or any dysphagia, or mouth discomfort.     REVIEW OF SYSTEMS:   See HPI, all other systems on review are negative.    Past Medical History:   Diagnosis Date     Asthenia      Osteoporosis      Weight loss      Social History     Tobacco Use   Smoking Status Never Smoker   Smokeless Tobacco Never Used     Family History   Problem Relation Age of Onset     Cancer Mother      Hypertension Mother      Stroke Father      Past Surgical History:   Procedure Laterality Date     APPENDECTOMY       DILATION AND CURETTAGE OF UTERUS       HYSTERECTOMY      unilateral oophorectomy     VAGINAL DELIVERY  1963     x 2; son and daughter; '63 and '66     VITALS:  Vitals:    07/22/19 1116   BP: 126/72   Patient Site: Left Arm   Patient Position: Sitting   Cuff Size: Adult Regular   Pulse: 94   SpO2: 96%   Weight: 129 lb (58.5 kg)   Height: 5' 3\" (1.6 m)     Wt Readings from Last 3 Encounters:   07/22/19 129 lb (58.5 kg)   05/08/19 129 lb (58.5 kg)   04/26/19 133 lb (60.3 kg)     PHYSICAL EXAM:  Constitutional:  In NAD, alert and oriented  Neck: no adenopathy is appreciated, or mass, thyroid feels normal, mildly prominent left upper carotid pulse is present.     Current Outpatient Medications   Medication Sig Dispense Refill     ascorbic acid, " vitamin C, (VITAMIN C) 100 MG tablet Take 100 mg by mouth daily.       b complex vitamins tablet Take 1 tablet by mouth daily.       calcium, as carbonate, (TUMS) 200 mg calcium (500 mg) chewable tablet Chew 1 tablet daily.       cholecalciferol, vitamin D3, (VITAMIN D3 ORAL) Take by mouth.       ferrous sulfate 325 (65 FE) MG tablet Take 1 tablet by mouth daily with breakfast.       GINSENG ORAL Take by mouth 3 (three) times a week.        loxapine (LOXITANE) 10 MG capsule Take 1 capsule (10 mg total) by mouth at bedtime. 30 capsule 6     multivitamin therapeutic tablet Take 1 tablet by mouth daily.       vitamin A 8000 UNIT capsule Take 8,000 Units by mouth daily.       vitamin E 200 UNIT capsule Take 200 Units by mouth daily.       No current facility-administered medications for this visit.      Mukund Martin MD  Internal Medicine  [facility]

## 2021-05-31 VITALS — WEIGHT: 113 LBS | BODY MASS INDEX: 20.02 KG/M2 | HEIGHT: 63 IN

## 2021-05-31 VITALS — WEIGHT: 108 LBS | BODY MASS INDEX: 19.75 KG/M2

## 2021-06-01 VITALS — HEIGHT: 62 IN | BODY MASS INDEX: 21.71 KG/M2 | WEIGHT: 118 LBS

## 2021-06-01 VITALS — BODY MASS INDEX: 22.84 KG/M2 | HEIGHT: 62 IN

## 2021-06-01 VITALS — BODY MASS INDEX: 22.84 KG/M2 | WEIGHT: 124.9 LBS

## 2021-06-01 NOTE — TELEPHONE ENCOUNTER
Received triage RN call. Patient asking for antibiotic to use for start of exacerbation. Per last PCP, she was given keflex to have on hand at start of sputum change.     Will give her another Rx for this. She is to talk to her doctor in the future for further antibiotics to have on hand.    Dr. Melissa

## 2021-06-01 NOTE — TELEPHONE ENCOUNTER
"  Call from pt       Requesting ABX     Indicates she is to have standing rx for bronchiectasis       Sx x 6 weeks - seems to be worsening over time - now also getting ear ache as well - 2 days ago      Cough and \"lots and phlegm\" - yellow exudate     No fever   Yes some wheezing   No other sx    Yes ear ache x 2 days        [facility] [address]         Pt tele# [phone]         Per   5/8/2019 note       1. Bronchiectasis without acute exacerbation (H)  She will get exacerbations about 3 times per year.  Will have on hand antibiotic therapy to take at onset of sputum change.  Will vary antibiotic used to avoid developing resistence.   - cephalexin (KEFLEX) 250 MG capsule; Take 1 capsule (250 mg total) by mouth 4 (four) times a day for 10 days.  Dispense: 40 capsule; Refill: 0    Reviewed chart - do see this note above from the 5/8 visit but no other standing orders can implement      I will page oncall provider       Call to Dr Uriarte - she will review and call Syed back       She called back - she will send in Rx for ABX     Pt to follow up with PCP to discuss having refills on file for recurrent episodes prn      Ilan Conley, RN   Triage and Medication Refills      "

## 2021-06-01 NOTE — PROGRESS NOTES
Substance use:  none  DAM: none  :non3  [facility] worker:none  Living Situation:independantly apartment  Therapist:no  MICH's:yes, daughter  PCP:yes, Mario    Depression:no  Anxiety:no  How are things going:things are good.  She feels like her medications are working for her.  She said she is here just to get established with Dr. Welsh    Patient denies paranoia or hallucinations.  She did say she hear  God, but prefers to keep those conversations private.  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    [place] :none    Correct pharmacy verified with patient and confirmed in snapshot? [x] yes []no    Charge captured ? [x] yes  [] no    Medications Phoned  to Pharmacy [] yes [x]no  Name of Pharmacist:  List Medications, including dose, quantity and instructions      Medication Prescriptions given to patient   [] yes  [x] no   List the name of the drug the prescription was written for.       Medications ordered this visit were e-scribed.  Verified by order class [x] yes  [] no    Medication changes or discontinuations were communicated to patient's pharmacy: [] yes  [x] NA    UA collected [] yes  [x] no    [place] Prescription Monitoring Program Reviewed? [] yes  [x] no    Referrals were made to:  NA    Future appointment was made: [x] yes  [] no    Dictation completed at time of chart check: [x] yes  [] no    I have checked the documentation for today s encounters and the above information has been reviewed and completed.

## 2021-06-01 NOTE — TELEPHONE ENCOUNTER
Triage note:    79 year old called to follow up on prior triage from today regarding refill of antibiotic for bronchiectasis.     RN gave her Dr. Melissa's instructions from 9/28/19 telephone encounter as follows:     9/28/19 10:59 AM   Note      Received triage RN call. Patient asking for antibiotic to use for start of exacerbation. Per last PCP, she was given keflex to have on hand at start of sputum change.      Will give her another Rx for this. She is to talk to her doctor in the future for further antibiotics to have on hand.     Dr. Melissa               Patient states she wanted the Rx sent to [facility] but it went to [facility].  She will go to [facility] to pick it up now.    Daniela García RN, [facility] Med Refill/Triage, 9/28/2019 2:37 PM

## 2021-06-01 NOTE — PROGRESS NOTES
"      Date of Service:  9/10/2019    Name:  Neisha Malcolm  :  1940  MRN:  [id number]    HPI:   Neisha Malcolm is a 79 y.o. female with history of schizophrenia, paranoia, last seen by Julita Arenas NP on 2019. This is pt is new to me.  Pt comes with her dtr Kristina (works in finance at [employer]).    Ms. Arenas worked with pt x 1 year (2018-2019). Pt was initially on olanzapine (prescribed by PCP), which pt dc'd due to concerns of potential for diabetes risk. Ms. Arenas ordered mellaril, (unavailable by ), and subsequently started loxapine in 2019. Pt reports she is doing well on this medication.  Denies side effects.     ROS: Denies psychosis, depression, anxiety. No problems with sleep or appetite.    Cognition-denies attention memory complaints.    Psychiatric History:     Per Ms. Arenas's 4/3/2018 note:  \"Patient reports a long history of schizophrenia. Pt reports her first crisis was when she was 44 years old.  She reports it took 3 years to have the condition under control. Patient then got better and started college at age 49. All went well under medications. She reports she was taking a Theoridizine which she said is no longer on the market. She also was put on Zyprexa.  She reports she only was hospitalized once over 30 years ago. She has not been in a crisis since 2017. She has not been on medication for the last 14 years and was highly functionning.  Patient reports she believes the source of stress that led to crisis came from the woman whom she did not get along with.  Currently patient is on Zyprexa which is prescribed by PCP,  \".   Hx late onset schizophrenia with crisis about 25 yrs ago in [facility]. Stable past 20 yrs no meds or psych care (hx per daughter).\"    Humberto Arenas NP note (4/3/2018)  -------------------------------------------------------------------------     Today, pt reports was on Mellaril x 19 yrs (dc'd in " "early 2000's. Pt said milagros was dc'd due to TD symptoms which stopped when it was dc'd).    She was reportedly stable and off meds x several years until 2016 when she became paranoid. She was living in [place] at the time, caring for her grandchildren at her son's home. Her son sent her back [facility].           Chemical use History:            N/A           Past Medical History:    PCP Dr. Martin  Bronchiectasis  Asthma    1/3/2019 EKG:   QTC CALCULATION (BEZET) ms 415            Past Medical History:   Diagnosis Date     Asthenia      Osteoporosis      Weight loss        Past Surgical History:   Procedure Laterality Date     APPENDECTOMY       DILATION AND CURETTAGE OF UTERUS       HYSTERECTOMY      unilateral oophorectomy     VAGINAL DELIVERY  1963     x 2; son and daughter; '63 and '66      Family Psychiatric History:      Unknown      Social History:      Lives independently in downtown [facility] x 3 yrs.    2 grown children-[place] lives in [place], [facility].    Driving- Pt drives the [employer] van.  She does not own her own car.    She volunteers at [employer] x 3 yrs.  This organization is funded by the [facility] and supports single pregnant women.     \"Grew up in [place]  lived in [facility] [place] then  then moved to [facility] [place] [facility] area to be [facility] in 2000 for grandkids (son). Moved back to be near daughter Shante in 2016. Non smoker/non drinker. [employer] then [employer]. 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     Trauma history:      Did not ask           Medications:     Current Outpatient Medications   Medication Sig Dispense Refill     ascorbic acid, vitamin C, (VITAMIN C) 100 MG tablet Take 100 mg by mouth daily.       b complex vitamins tablet Take 1 tablet by mouth daily.       calcium, as carbonate, (TUMS) 200 mg calcium (500 mg) chewable tablet Chew 1 tablet daily.       cholecalciferol, vitamin D3, (VITAMIN D3 ORAL) Take by " "mouth.       loxapine (LOXITANE) 10 MG capsule Take 1 capsule (10 mg total) by mouth at bedtime. 30 capsule 4     multivitamin therapeutic tablet Take 1 tablet by mouth daily.       vitamin A 8000 UNIT capsule Take 8,000 Units by mouth daily.       vitamin E 200 UNIT capsule Take 200 Units by mouth daily.       No current facility-administered medications for this visit.         Associated Clinical Documents:       Notes reviewed in EPIC and [facility] including: medication reconciliation, nurses's notes, progress notes, recent labs, PMH, and OSH records.    Blood pressure 143/69, pulse 94, temperature 97.7  F (36.5  C), temperature source Oral, weight 130 lb (59 kg), not currently breastfeeding.      ROS:       10 point ROS was negative except for the items listed in HPI.    MSE:      Vital signs: Blood pressure 143/69, pulse 94, temperature 97.7  F (36.5  C), temperature source Oral, weight 130 lb (59 kg), not currently breastfeeding.    Alert & oriented x 3.   Appearance: Appears stated age, casually dressed.  Speech: Normal rate, rhythm and tone.  Gait: Normal.  Musculoskeletal: Normal strength, no abnormal movements.  Mood/Affect: Neutral.  Thought Process: Normal rate, logical.  Thought Content: No suicide or homicide ideation.  Associations: Intact, no delusions.  Perceptions: No hallucinations.  Memory: recent and remote memory intact.  Attention span and concentration: normal.  Language: Intact.  Fund of Knowledge: Normal.  Insight and Judgement: Adequate.      Impression:      1.  Schizophrenia spectrum disorder- stable.  2. Bronchiectasis/Asthma- sx worse this summer. pP intends to ask PCP for inhalers (has been off them x3 yrs)    Plan:     I spoke to Shelby Medina, Pharm.D about risks/benefits of loxapine and read paper: \"Revisiting Loxapine: a systemic review (Sharif et al, Annals General Psychiatry, 2015). 10mg loxapine is ~equivalent to 2mg haldol (relatively small dose). I reviewed risks/benefits of " medication with patient and dtr. including possible risk of death, cardiovascular events, and TD.    Had discussion about baseline cognitive testing/neuropsych. They denied cognitive concerns and do not feel this is indicated at this time.    DISCUS score today=0    1. Continue loxapine 10 mg nightly. F/u  3 mos or prn.  2. PCP appt in Oct.        Total Time and Coordination of Care:       40 Minutes spent in the care of this patient with >50% of this time was spent in   Face-to-face counseling specifically discussing and educating about the pharmacologic and psycho-social treatment options, the risks, benefits and side effects of medication, medication adherence, specialty referrals, patient goals and providing supportive therapy.     High risk decision making secondary to drug therapy requiring ongoing lab monitoring. Additional tests such as EKG, potential for multiple interactions, requiring special neuroleptic consent and education of the patient.      Prolonged service    I spent an additional 31+ minutes with the patient and dtr from  Start Time 9:30  End Time 10:02  Additional time needed due to to provide information and answer questions about pharmacotherapy.         This dictation was completed with speech recognition software and there may be unintended word substitutions.

## 2021-06-02 VITALS — BODY MASS INDEX: 23.03 KG/M2 | HEIGHT: 63 IN

## 2021-06-02 VITALS — BODY MASS INDEX: 23.04 KG/M2 | HEIGHT: 63 IN | WEIGHT: 130 LBS

## 2021-06-02 VITALS — WEIGHT: 130.4 LBS | HEIGHT: 63 IN | BODY MASS INDEX: 23.11 KG/M2

## 2021-06-02 NOTE — PATIENT INSTRUCTIONS - HE
1.  Will send to [facility].    2. Schedule the DXA  Scan    3. Continue calcium with vitamin D supplement.     4. Follow up in 6 months.

## 2021-06-02 NOTE — PROGRESS NOTES
" ASSESSMENT AND PLAN:    1. Bronchiectasis with acute exacerbation (H)  Will treat as needed with courses of zpak.    - azithromycin (ZITHROMAX Z-EZEQUIEL) 250 MG tablet; Take 2 tablets (500 mg) on  Day 1,  followed by 1 tablet (250 mg) once daily on Days 2 through 5.  Dispense: 6 tablet; Refill: 4    2.Osteoporosis  Previous DXA revealed osteoporosis.  Is on calcium with vitamin D. May be good candidate for additional rx - biphosphonates?   Follow with DXA  - DXA Bone Density Scan    3. Screening for colon cancer  - [facility]    Patient Instructions   1.  Will send to [facility].    2. Schedule the DXA  Scan    3. Continue calcium with vitamin D supplement.     4. Follow up in 6 months.      CHIEF COMPLAINT:  Chief Complaint   Patient presents with     Establish Care     HISTORY OF PRESENT ILLNESS:  Neisha Maloclm is a 79 y.o. female  Here to establish care. Feeling well.  Denies back pain or unusual dyspnea, or nausea.  No unusual cough or fever.      REVIEW OF SYSTEMS:   See HPI, all other systems on review are negative.    Past Medical History:   Diagnosis Date     Asthenia      Osteoporosis      Weight loss      Social History     Tobacco Use   Smoking Status Never Smoker   Smokeless Tobacco Never Used     Family History   Problem Relation Age of Onset     Cancer Mother      Hypertension Mother      Stroke Father      Past Surgical History:   Procedure Laterality Date     APPENDECTOMY       DILATION AND CURETTAGE OF UTERUS       HYSTERECTOMY      unilateral oophorectomy     VAGINAL DELIVERY  1963     x 2; son and daughter; '63 and '66     VITALS:  Vitals:    10/17/19 0835   BP: 116/74   Patient Site: Left Arm   Patient Position: Sitting   Cuff Size: Adult Regular   Pulse: 80   SpO2: 98%   Weight: 130 lb 8 oz (59.2 kg)   Height: 5' 2\" (1.575 m)     Wt Readings from Last 3 Encounters:   10/17/19 130 lb 8 oz (59.2 kg)   09/10/19 130 lb (59 kg)   07/22/19 129 lb (58.5 kg)     PHYSICAL EXAM:  Constitutional:  In NAD, " alert and oriented  Neck: no significant cervical or axillary adenopathy  Cardiac:  S1 S2   Lungs: Clear   Abdomen:   Soft, flat and non-tender.    Neurologic:  Speech clear, no arm or leg weakness, gait normal     Psychiatric:  Mood and behavior appropriate, thinking is clear.     DECISION TO OBTAIN OLD RECORDS AND/OR OBTAIN HISTORY FROM SOMEONE OTHER THAN PATIENT, AND/OR ACCESSING CARE EVERYWHERE):  1 0     REVIEW AND SUMMARIZATION OF OLD RECORDS, AND/OR OBTAINING HISTORY FROM SOMEONE OTHER THAN PATIENT, AND/OR DISCUSSION OF CASE WITH ANOTHER HEALTH CARE PROVIDER:  2 0    REVIEW AND/OR ORDER OF OF CLINICAL LAB TESTS: 1 reviewed 2018 lab testing.    REVIEW AND/OR ORDER OF RADIOLOGY TESTS: 1 reviewed DXA 2017    REVIEW AND/OR ORDER OF MEDICAL TESTS (EKG/ECHO/COLONOSCOPY/EGD): 1  0    INDEPENDENT  VISUALIZATION OF IMAGE, TRACING, OR SPECIMEN ITSELF (2 EACH):  0     TOTAL: 2    Current Outpatient Medications   Medication Sig Dispense Refill     ascorbic acid, vitamin C, (VITAMIN C) 100 MG tablet Take 100 mg by mouth daily.       b complex vitamins tablet Take 1 tablet by mouth daily.       calcium, as carbonate, (TUMS) 200 mg calcium (500 mg) chewable tablet Chew 1 tablet daily.       cholecalciferol, vitamin D3, (VITAMIN D3 ORAL) Take by mouth.       loxapine (LOXITANE) 10 MG capsule Take 1 capsule (10 mg total) by mouth at bedtime. 30 capsule 4     multivitamin therapeutic tablet Take 1 tablet by mouth daily.       vitamin A 8000 UNIT capsule Take 8,000 Units by mouth daily.       vitamin E 200 UNIT capsule Take 200 Units by mouth daily.       azithromycin (ZITHROMAX Z-EZEQUIEL) 250 MG tablet Take 2 tablets (500 mg) on  Day 1,  followed by 1 tablet (250 mg) once daily on Days 2 through 5. 6 tablet 4     Mukund Martin MD  Internal Medicine  [facility]

## 2021-06-03 VITALS
DIASTOLIC BLOOD PRESSURE: 69 MMHG | HEART RATE: 94 BPM | BODY MASS INDEX: 23.03 KG/M2 | WEIGHT: 130 LBS | SYSTOLIC BLOOD PRESSURE: 143 MMHG | TEMPERATURE: 97.7 F

## 2021-06-03 VITALS — BODY MASS INDEX: 22.86 KG/M2 | WEIGHT: 129 LBS | HEIGHT: 63 IN

## 2021-06-03 VITALS
OXYGEN SATURATION: 98 % | HEIGHT: 62 IN | BODY MASS INDEX: 24.01 KG/M2 | SYSTOLIC BLOOD PRESSURE: 116 MMHG | WEIGHT: 130.5 LBS | HEART RATE: 80 BPM | DIASTOLIC BLOOD PRESSURE: 74 MMHG

## 2021-06-03 VITALS — WEIGHT: 133 LBS | BODY MASS INDEX: 23.57 KG/M2 | HEIGHT: 63 IN

## 2021-06-03 NOTE — PROGRESS NOTES
Pt rescheduled for February 4, 2020. She reports things are going fine and declines the offer for a nurse only appointment, but will notify us if things change. Pt understands to contact her pharmacy for medication refills.

## 2021-06-03 NOTE — PROGRESS NOTES
Please call pt and tell her appt changed for Jan 2020.  Please schedule NO visit (unless pt reports she's doing fine). Check how she's doing on meds.

## 2021-06-04 VITALS
DIASTOLIC BLOOD PRESSURE: 74 MMHG | HEIGHT: 62 IN | WEIGHT: 135 LBS | HEART RATE: 87 BPM | SYSTOLIC BLOOD PRESSURE: 139 MMHG | BODY MASS INDEX: 24.84 KG/M2

## 2021-06-05 VITALS
HEIGHT: 63 IN | BODY MASS INDEX: 23.04 KG/M2 | SYSTOLIC BLOOD PRESSURE: 134 MMHG | WEIGHT: 130 LBS | DIASTOLIC BLOOD PRESSURE: 74 MMHG | HEART RATE: 84 BPM | OXYGEN SATURATION: 98 %

## 2021-06-05 NOTE — PROGRESS NOTES
Correct pharmacy verified with patient and confirmed in snapshot? [x] yes []no    Charge captured ? [x] yes  [] no    Medications Phoned  to Pharmacy [] yes [x]no  Name of Pharmacist:  List Medications, including dose, quantity and instructions      Medication Prescriptions given to patient   [] yes  [x] no   List the name of the drug the prescription was written for.       Medications ordered this visit were e-scribed.  Verified by order class [x] yes  [] no    Medication changes or discontinuations were communicated to patient's pharmacy: [] yes  [x] no    UA collected [] yes  [x] no    [place] Prescription Monitoring Program Reviewed? [x] yes  [] no    Referrals were made to:  none  Future appointment was made: [x] yes  [] no    Dictation completed at time of chart check: [] yes  [x] no    I have checked the documentation for today s encounters and the above information has been reviewed and completed.

## 2021-06-05 NOTE — TELEPHONE ENCOUNTER
Pt called and reports the loxapine she has been prescribed is no longer being made or is no longer available according to her pharmacy.  She would like to discuss a possible replacement medication of thioridazine.  Pt can be reached at [phone]

## 2021-06-05 NOTE — TELEPHONE ENCOUNTER
Called pt's pharmacy and apparently all [facility] are on back order for a supply of loxapine. Spoke with patient also and she is not willing to try another pharmacy, as she's new to the [facility] and not comfortable driving. Pt states that she's been on thioridazine in the past and that its worked well. Pt has enough of the loxapine to get her through Friday, 1/24/20. She's hoping to switch to thioridazine until loxapine is available again.

## 2021-06-05 NOTE — PROGRESS NOTES
" Date of Service:  2020    Name:  Neisha Malcolm  :  1940  MRN:  [id number]    HPI:   Neisha Malcolm is a 79 y.o. female with history of schizophrenia, paranoia, seen for first visit in Sept (transfer fromJulita Arenas NP).  She comes with her dtr Kristina.    She came to me on loxapine.  In , the pharmacy told her loxapine was not manufactured anymore. On 2020, loxapine DC'd and Thorazine started.  Pt reports she is doing well on this medication.  Denies side effects.     ROS: Denies psychosis, depression, anxiety. No problems with sleep or appetite.    Cognition-denies problems with attention or memory.  Declined offer for neuropsych testing.    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    Dtr said:  \"I wish she did not drive.  I am not worried about her ability, it is the other drivers on the road I worry about\".  No accidents. I recommended senior  eval through [facility] or [facility].  They declined.    Exercise-walks on treadmill 2x/week, climbs steps, walks to Religious.    Writing a book about her experience with mental illness. Asked if I would read and make comment book jacket when it gets published.      Chemical use History:            N/A           Past Medical History:    PCP Dr. Martin  Bronchiectasis  Asthma    1/3/2019 EKG:   QTC CALCULATION (BEZET) ms 415            Past Medical History:   Diagnosis Date     Asthenia      Osteoporosis      Weight loss        Past Surgical History:   Procedure Laterality Date     APPENDECTOMY       DILATION AND CURETTAGE OF UTERUS       HYSTERECTOMY      unilateral oophorectomy     VAGINAL DELIVERY  1963     x 2; son and daughter;  and       Family Psychiatric History:      Unknown      Social History:      [place] independently [place] [facility] [place] 3 yrs.    2 grown children-son lives in [place], dtr Kristina in [place] [place].    She volunteers at [employer] x 3 yrs.  This " "organization is for single pregnant women.     \"Grew up in [place]  lived in [facility] [place] then  then moved to [facility] area to be  in 2000 for grandkids (son). Moved back to be near daughter Shante in 2016. Non smoker/non drinker. [employer] then [employer]. 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     Trauma history:      Did not ask           Medications:     Current Outpatient Medications   Medication Sig Dispense Refill     ascorbic acid, vitamin C, (VITAMIN C) 100 MG tablet Take 100 mg by mouth daily.       b complex vitamins tablet Take 1 tablet by mouth daily.       calcium, as carbonate, (TUMS) 200 mg calcium (500 mg) chewable tablet Chew 1 tablet daily.       chlorproMAZINE (THORAZINE) 25 MG tablet 25mg q hs x 3 nocs. If not effective, may increase dose to 50mg q hs. 60 tablet 1     cholecalciferol, vitamin D3, (VITAMIN D3 ORAL) Take by mouth.       multivitamin therapeutic tablet Take 1 tablet by mouth daily.       phytonadione, vit K1, (PHYTONADIONE, VITAMIN K1,) 100 mcg tablet Take 100 mcg by mouth daily.       vitamin A 8000 UNIT capsule Take 8,000 Units by mouth daily.       vitamin E 200 UNIT capsule Take 200 Units by mouth daily.       No current facility-administered medications for this visit.         Associated Clinical Documents:       Notes reviewed in EPIC and [facility] including: medication reconciliation, nurses's notes, progress notes, recent labs, PMH, and OSH records.    Blood pressure 139/74, pulse 87, height 5' 2\" (1.575 m), weight 135 lb (61.2 kg), not currently breastfeeding.      ROS:       10 point ROS was negative except for the items listed in HPI.    MSE:      Vital signs: Blood pressure 139/74, pulse 87, height 5' 2\" (1.575 m), weight 135 lb (61.2 kg), not currently breastfeeding.    Alert & oriented x 3.   Appearance: Appears stated age, casually dressed.  Speech: Normal rate, rhythm and tone.  Gait: Normal.  Musculoskeletal: Normal strength, no abnormal " movements.  Mood/Affect: Neutral.  Thought Process: Normal rate, logical.  Thought Content: No suicide or homicide ideation.  Associations: Intact, no delusions.  Perceptions: No hallucinations.  Memory: recent and remote memory intact.  Attention span and concentration: normal.  Language: Intact.  Fund of Knowledge: Normal.  Insight and Judgement: Adequate.      Impression:      1.  Schizophrenia spectrum disorder- stable.  2. Cognitive status-no cognitive complaints at this time.  Declined  eval and neuropsych testing      Plan:     1. Continue meds as above, F/u 4 mos or prn.      Total Time and Coordination of Care:       25 Minutes spent in the care of this patient with >50% of this time was spent in   Face-to-face counseling specifically discussing and educating about the pharmacologic and psycho-social treatment options, the risks, benefits and side effects of medication, patient goals and providing supportive therapy.     High risk decision making secondary to drug therapy requiring ongoing lab monitoring. Additional tests such as EKG, potential for multiple interactions, requiring special neuroleptic consent and education of the patient.        This dictation was completed with speech recognition software and there may be unintended word substitutions.

## 2021-06-05 NOTE — TELEPHONE ENCOUNTER
Please call pt and clarify that:  The new rx is for Thorazine not Thioridazine.   Thioridazine is Mellaril (which is no longer available).

## 2021-06-05 NOTE — TELEPHONE ENCOUNTER
Please connect with the patient regarding her medication prescription for an update when available at [phone].

## 2021-06-05 NOTE — PROGRESS NOTES
Substance use: none  Living Situation:lives alone in an apartment, sleeps and works at [employer], 7 days a week.  Therapist: none  MICH's:none  PCP: Dr. Martin, seen in October    Depression:none  Anxiety:none  How are things going:  Patient has no concerns today    [place] : no activity

## 2021-06-05 NOTE — TELEPHONE ENCOUNTER
Spoke with pt, she's just following up with what Dr. Welsh would like to do regarding her medication change. Informed her Dr. Welsh is in clinic today and that message will be passed onto her.

## 2021-06-05 NOTE — TELEPHONE ENCOUNTER
I reviewed info on Thorazine (excerpts below).  Plan:  1. DC Loxapine  2. Start Thorazine 25mg q hs x 3 nocs. If no response, may increase to 50mg q hs.  3. RN to call pt and inform her of med change and possible s/e's- orthostatic hypotension, sedation, dizzyness.  4. Pt to call if problems w/new med. Appt in Feb.  -------------------------------------------------------------      Antipsychotic Dose Equivalents  It is not uncommon that patients may need to be switched from one antipsychotic to another. Chlorpromazine equivalents help guide clinicians in estimating an approximately equivalent dose when transitioning from one antipsychotic to another.  Antipsychotic Dose Equivalents (based on chlorpromazine)  Generic Brand Dose Equivalent   First Generation Antipsychotics   Chlorpromazine Thorazine  100 mg   Fluphenazine Prolixin  2 mg   Haloperidol Haldol  2 mg   Loxapine Loxitane  10 mg   https://cpnp.org/guideline/[email or web address]-dose-equivalents  ----------------------------------  Micromedex  Adverse Effects  Common   Cardiovascular: Orthostatic hypotension, Tachycardia   Endocrine metabolic: Ineffective thermoregulation, Heatstroke or Hypothermia   Gastrointestinal: Constipation, Nausea, Xerostomia   Neurologic: Akathisia, Dizziness, Parkinsonism, Somnolence, Tardive dyskinesia  Serious   Cardiovascular: Prolonged QT interval   Dermatologic: Erythroderma   Gastrointestinal: Obstipation, Paralytic ileus   Hematologic: Agranulocytosis, Aplastic anemia, Leukopenia   Hepatic: Jaundice   Immunologic: Anaphylactoid reaction, Systemic lupus erythematosus   Neurologic: Dystonia, Neuroleptic malignant syndrome, Seizure    [email or web address]/evidencexpert/ND_B/evidencexpert/ND_AppProduct/evidencexpert/ND_T/evidencexpert/[email or web address]&title=Chlorpromazine+Hydrochloride&servicesTitle=Chlorpromazine+Hydrochloride&brandName=Thorazine#

## 2021-06-05 NOTE — TELEPHONE ENCOUNTER
Called patient and communicated Dr. Welsh's directives. Patient stated she understood and repeated back the instructions.

## 2021-06-06 NOTE — TELEPHONE ENCOUNTER
Pharmacy notification to provider of national back order of medication: Loxapine 10 mg capsule till May 2020 per supplier    Pharmacy is requesting a substitution for this medication.    Please send new rx to [facility] [address], [place] [address]

## 2021-06-07 NOTE — TELEPHONE ENCOUNTER
Thorazine order from 1/21/20 had been discontinued at 2/18/20 visit. New order and refills ordered on 2/18/20. Pharmacy reported they had two orders, verified Dr Welsh's current order.

## 2021-06-08 NOTE — PROGRESS NOTES
"Telemedicine Visit: The patient's condition can be safely assessed and treated via synchronous audio and visual telemedicine encounter.      Reason for Telemedicine Visit: Patient has requested telehealth visit    Originating Site (Patient Location): Patient's home    Distant Site (Provider Location): [facility]: [facility].    Consent:  The patient/guardian has verbally consented to: the potential risks and benefits of telemedicine (video visit) versus in person care; bill my insurance or make self-payment for services provided; and responsibility for payment of non-covered services.     Mode of Communication:  Video Conference via [employer]    As the provider I attest to compliance with applicable laws and regulations related to telemedicine.         HPI:   Neisha Malcolm is a 80y.o. female with history of schizophrenia, paranoia, here for follow-up visit.  Last seen in Feb. She had been stable on loxapine many years.  In Jan 2020, loxapine DC'd and Thorazine started because loxapine is not manufactured anymore.   Pt reports she is doing well on this medication.  Denies side effects.        ROS: Denies psychosis, depression, anxiety. No problems with sleep or appetite.   Cognition-denies problems with attention or memory.  Declined offer for neuropsych testing.     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  She describes herself as a \"risk taker\".  Bought a keyboard, plays an hour each day.    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        I have reviewed and updated the patient's Past Medical History, Social History, Family History and Medication List.      Current Outpatient Medications   Medication Sig Dispense Refill     ascorbic acid, vitamin C, (VITAMIN C) 100 MG tablet Take 100 mg by mouth daily.       aspirin 325 MG tablet Take 325 mg by mouth 2 (two) times a week.       " b complex vitamins tablet Take 1 tablet by mouth daily.       chlorproMAZINE (THORAZINE) 25 MG tablet Take 1 tablet (25 mg total) by mouth at bedtime. 30 tablet 4     cholecalciferol, vitamin D3, (VITAMIN D3 ORAL) Take by mouth.       multivitamin therapeutic tablet Take 1 tablet by mouth daily.       phytonadione, vit K1, (PHYTONADIONE, VITAMIN K1,) 100 mcg tablet Take 100 mcg by mouth daily.       vitamin E 200 UNIT capsule Take 200 Units by mouth daily.       vitamin A 8000 UNIT capsule Take 8,000 Units by mouth daily.       No current facility-administered medications for this visit.        ALLERGIES  Gluten and Peanut        MSE:      Vital signs: not currently breastfeeding.    Alert & oriented x 3.   Appearance: Appears stated age, casually dressed.  Speech: Normal rate, rhythm and tone.  Gait: Normal.  Musculoskeletal: Normal strength, no abnormal movements.  Mood/Affect: Neutral.  Thought Process: Normal rate, logical.  Thought Content: No suicide or homicide ideation.  Associations: Intact, no delusions.  Perceptions: No hallucinations.  Memory: recent and remote memory intact.  Attention span and concentration: normal.  Language: Intact.  Fund of Knowledge: Normal.  Insight and Judgement: Adequate.          Impression:      1.  Schizophrenia spectrum disorder- stable.  2. Cognitive status-no cognitive complaints at this time.  Declined  eval and neuropsych testing in 2/2020.        Plan:     1. Continue meds as above, F/u 4 mos or prn.        Total Time and Coordination of Care:       25 Minutes spent in the care of this patient with >50% of this time was spent in   Face-to-face counseling specifically discussing and educating about the pharmacologic and psycho-social treatment options, the risks, benefits and side effects of medication, patient goals and providing supportive therapy.      High risk decision making secondary to drug therapy requiring ongoing lab monitoring. Additional tests such as  EKG, potential for multiple interactions, requiring special neuroleptic consent and education of the patient.          Meaghan Welsh MD       Start Time 9:00 AM  End Time 9:28 AM    Phone call duration: 28 minutes

## 2021-06-08 NOTE — TELEPHONE ENCOUNTER
RN cannot approve Refill Request    RN can NOT refill this medication med is not covered by policy/route to provider and medication not on med list     . Last office visit: 10/17/2019 Mukund Martin MD Last Physical: Visit date not found Last MTM visit: Visit date not found Last visit same specialty: 10/17/2019 Mukund Martin MD.  Next visit within 3 mo: Visit date not found  Next physical within 3 mo: Visit date not found      Farhana Le, [facility] Triage/Med Refill 5/28/2020    Requested Prescriptions   Pending Prescriptions Disp Refills     azithromycin (ZITHROMAX) 250 MG tablet [Pharmacy Med Name: AZITHROMYCIN 250 MG DOSE PACK] 6 tablet 4     Sig: TAKE 2 TABLETS BY MOUTH TODAY, THEN TAKE 1 TABLET DAILY FOR 4 DAYS       There is no refill protocol information for this order

## 2021-06-08 NOTE — PROGRESS NOTES
This video/telephone visit will be conducted via a call between you and your physician/provider. We have found that certain health care needs can be provided without the need for an in-person physical exam. This service lets us provide the care you need with a video /telephone conversation. If a prescription is necessary we can send it directly to your pharmacy. If lab work is needed we can place an order for that and you can then stop by our lab to have the test done at a later time.    Just as we bill insurance for in-person visits, we also bill insurance for video/telephone visits. If you have questions about your insurance coverage, we recommend that you speak with your insurance company.    Patient has given verbal consent for a video visit? yes  Patient would like the video visit invitation sent by:  Send to email: [email or web address]  Patient verified allergies, medications and pharmacy via phone. Patient states she is ready for visit.    [place]  was reviewed prior to seeing patient today and show no activity.  Margo Carter, CMA

## 2021-06-08 NOTE — PROGRESS NOTES
________________________________________  Medications Phoned  to Pharmacy [] yes [x]no  Name of Pharmacist:  List Medications, including dose, quantity and instructions    Medications ordered this visit were e-scribed.  Verified by order class [] yes  [x] no    Medication changes or discontinuations were communicated to patient's pharmacy: [] yes  [x] no    Dictation completed at time of chart check: [x] yes  [] no    I have checked the documentation for today s encounters and the above information has been reviewed and completed.       [employer]

## 2021-06-09 NOTE — PROGRESS NOTES
Neisha Malcolm  1940      Assessment and Plan:  1 lives alone with help from daughter and actually doing remarkably well and made it through the winter months without any problems she thinks the [place] winters are easier on her chronic lung disease compared with [place]  2 chronic schizophrenia stable remission she is getting along living independently with help of daughter no major issuespsychiatric meds currently  3 routine labs today she's worried about diabetes we'll check that and also she worries about vitamin D levels    Chief Complaint: Blood tests and follow-up    Visit diagnoses:    1. Hyperlipemia  Comprehensive Metabolic Panel    LDL Cholesterol, Direct    HM2(CBC w/o Differential)    Thyroid Stimulating Hormone (TSH)   2. Vitamin D deficiency  Vitamin D, Total (25-Hydroxy)     Patient Active Problem List   Diagnosis     Bronchiectasis     History of schizophrenia     Asthenia     Past Medical History:   Diagnosis Date     Asthenia      Weight loss      Past Surgical History:   Procedure Laterality Date     APPENDECTOMY       DILATION AND CURETTAGE OF UTERUS       HYSTERECTOMY      unilateral oophorectomy     VAGINAL DELIVERY  1963     x 2; son and daughter; '63 and '66     Family History   Problem Relation Age of Onset     Cancer Mother      Hypertension Mother      Stroke Father      Social History     Social History     Marital status: Single     Spouse name: N/A     Number of children: 2     Years of education: N/A     Occupational History     pastoral care/LPN      retired     Social History Main Topics     Smoking status: Never Smoker     Smokeless tobacco: Not on file     Alcohol use No     Drug use: Not on file     Sexual activity: Not on file     Other Topics Concern     Not on file     Social History Narrative    Grew up in [place]  lived in [facility] then  then moved to [place] area to be [facility] in 2000 for grandkids (son). Moved back to be near daughter  "[place] in 2016. Non smoker/non drinker. [employer] then [employer]. 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 Hx late onset schizophrenia with crisis about 25 yrs ago in [facility]. Stable past 20 yrs no meds or psych care (hx per daughter)       Meds:  Current Outpatient Prescriptions   Medication Sig Dispense Refill     ascorbic acid, vitamin C, (VITAMIN C) 100 MG tablet Take 100 mg by mouth daily.       b complex vitamins tablet Take 1 tablet by mouth daily.       cyanocobalamin (VITAMIN B-12) 1000 MCG tablet Take 1,000 mcg by mouth daily.       ERGOCALCIFEROL, VITAMIN D2, (VITAMIN D2 ORAL) Take by mouth daily.       IRON,CARBONYL/VIT C/VIT B12/FA (IRON 100 PLUS ORAL) Take by mouth.       multivitamin capsule Take 1 capsule by mouth daily.       vitamin A 8000 UNIT capsule Take 8,000 Units by mouth daily.       vitamin E 200 UNIT capsule Take 200 Units by mouth daily.       No current facility-administered medications for this visit.        No Known Allergies    ROS: complete review of symptoms otherwise negative except as noted below    S:  She wants to check for diabetes she is here for general check once blood tests remarkably she's done well she moved here from [place] last year she has chronic schizophrenia but that has been stable her daughter keeps an eye on her and she lives downtown [place] ambulates without difficulty and the neighbor was able to shop etc.. Remarkably she states her lung disease seems to be better here in [place] even during the winter months compared with the chronic damp weather of [place].       O:   Vitals:    04/06/17 0851   BP: 112/64   Patient Site: Left Arm   Patient Position: Sitting   Cuff Size: Adult Regular   Pulse: 69   Resp: 15   Temp: 97.7  F (36.5  C)   TempSrc: Oral   Weight: 115 lb 4.8 oz (52.3 kg)   Height: 5' 2.25\" (1.581 m)       Physical Exam:  General- chronically asthenic appearing woman with suggestion of chronic mental illness she has a scarf " on her head and a crucifix dangling around her neck very easy to smile looks relaxed today more so than first couple of visits her daughter is with her   VS- see above  HEENT- neg   Neck- no adenopathy/thyromegaly/bruits  Chest- clear   Cor- reg no murmurs/gallops/ectopics  Abdomen- soft non tender, no masses; no organomegaly  Extremities: no edema, good distal pulses  Neuro- Cr. NN-  intact, alert, see above for general appearance    Recent Results (from the past 240 hour(s))   Comprehensive Metabolic Panel   Result Value Ref Range    Sodium 137 136 - 145 mmol/L    Potassium 4.3 3.5 - 5.0 mmol/L    Chloride 102 98 - 107 mmol/L    CO2 27 22 - 31 mmol/L    Anion Gap, Calculation 8 5 - 18 mmol/L    Glucose 89 70 - 125 mg/dL    BUN 12 8 - 28 mg/dL    Creatinine 0.73 0.60 - 1.10 mg/dL    GFR MDRD Af Amer >60 >60 mL/min/1.73m2    GFR MDRD Non Af Amer >60 >60 mL/min/1.73m2    Bilirubin, Total 0.4 0.0 - 1.0 mg/dL    Calcium 9.7 8.5 - 10.5 mg/dL    Protein, Total 6.6 6.0 - 8.0 g/dL    Albumin 3.8 3.5 - 5.0 g/dL    Alkaline Phosphatase 62 45 - 120 U/L    AST 19 0 - 40 U/L    ALT 16 0 - 45 U/L   LDL Cholesterol, Direct   Result Value Ref Range    Direct  (H) <=129 mg/dl   HM2(CBC w/o Differential)   Result Value Ref Range    WBC 4.1 4.0 - 11.0 thou/uL    RBC 4.21 3.80 - 5.40 mill/uL    Hemoglobin 13.1 12.0 - 16.0 g/dL    Hematocrit 38.1 35.0 - 47.0 %    MCV 91 80 - 100 fL    MCH 31.2 27.0 - 34.0 pg    MCHC 34.5 32.0 - 36.0 g/dL    RDW 11.7 11.0 - 14.5 %    Platelets 254 140 - 440 thou/uL    MPV 7.2 7.0 - 10.0 fL   Thyroid Stimulating Hormone (TSH)   Result Value Ref Range    TSH 1.19 0.30 - 5.00 uIU/mL   Vitamin D, Total (25-Hydroxy)   Result Value Ref Range    Vitamin D, Total (25-Hydroxy) 21.5 (L) 30.0 - 80.0 ng/mL       Mukund Leonard MD

## 2021-06-09 NOTE — PROGRESS NOTES
"Neisha Malcolm is a 80 y.o. female who is being evaluated via a billable telephone visit.      The patient has been notified of following:     \"This telephone visit will be conducted via a call between you and your physician/provider. We have found that certain health care needs can be provided without the need for a physical exam.  This service lets us provide the care you need with a short phone conversation.  If a prescription is necessary we can send it directly to your pharmacy.  If lab work is needed we can place an order for that and you can then stop by our lab to have the test done at a later time.    Telephone visits are billed at different rates depending on your insurance coverage. During this emergency period, for some insurers they may be billed the same as an in-person visit.  Please reach out to your insurance provider with any questions.    If during the course of the call the physician/provider feels a telephone visit is not appropriate, you will not be charged for this service.\"    Patient has given verbal consent to a Telephone visit? Yes to Maribell Magana CMA    What phone number would you like to be contacted at? [phone]    Patient would like to receive their AVS by AVS Preference: Mail a copy.    Additional provider notes: She has noted pain with talking, but the discomfort does require a lot of talking.  The pain is located in the madonna's apple area.  No odynophagia, or dysphagia.  No real sore throat.  It hurts particularly with a sharp pain with coughing. She has bronchiectasis and does have a chronic, often congested cough. She uses azithromycin and that does cause some improvement in the congestion and cough but less and less so.  She does not recall us deciding to use alternative antibiotics (we were going to try cephalexin, in a visit one year ago, but she does not recall trying that.)  No fever, weight is stable.  No increased dyspnea.     Assessment/Plan:    1.Neck/throat " pain  Suspect muscular 'strap' neck muscle pain from chronic cough.  Doubt thyroiditis given lack of thyroid symptoms.  Will follow up in 3-4 weeks and prn if fails to improve.     2. Chronic bronchiectasis  She is in agreement to try to alternate azithromycin with cephalexin for congested cough.  Follow up one month if fails to go well.     Phone call duration: 12 minutes    Mukund Martin MD

## 2021-06-10 NOTE — TELEPHONE ENCOUNTER
Date of Last Office Visit: 6/2/20  Date of Next Office Visit: 10/6/20  No shows since last visit: none  Cancellations since last visit: none  ED visits since last visit:  none  Medication chlorproMAZINE 25mg  date last ordered: 2/18/20  Qty: 30  Refills: 4  Lapse in therapy greater than 7 days: yes  Medication refill request verified as identical to current order: yes  Result of Last DAM, VPA, Li+ Level, CBC, or Carbamazepine Level (at or since last visit): N/A     [] Medication refilled per [facility] M-1.   [x] Medication unable to be refilled by RN due to criteria not met as indicated below:     []Eligibility - not seen in last year    []Supervision - no future appointment    [x]Compliance     []Verification - order discrepancy    []Controlled Medication    []Medication not included in RN Protocol    []90 - day supply request    []Other   Current Medication list:    ascorbic acid, vitamin C, (VITAMIN C) 100 MG tablet  Take 100 mg by mouth daily.    aspirin 325 MG tablet  Take 325 mg by mouth 2 (two) times a week.    azithromycin (ZITHROMAX) 250 MG tablet  Take 1 tablet by mouth daily.    b complex vitamins tablet  Take 1 tablet by mouth daily.    chlorproMAZINE (THORAZINE) 25 MG tablet  Take 1 tablet (25 mg total) by mouth at bedtime.    cholecalciferol, vitamin D3, (VITAMIN D3 ORAL)  Take by mouth.    multivitamin therapeutic tablet  Take 1 tablet by mouth daily.    phytonadione, vit K1, (PHYTONADIONE, VITAMIN K1,) 100 mcg tablet  Take 100 mcg by mouth daily.    vitamin A 8000 UNIT capsule  Take 8,000 Units by mouth daily.    vitamin E 200 UNIT capsule  Take 200 Units by mouth daily.        Medication Plan of Care at last office visit with MD/CNP:    Plan:     1. Continue meds as above, F/u 4 mos or prn.

## 2021-06-11 NOTE — PROGRESS NOTES
Assessment and Plan:     Patient has been advised of split billing requirements and indicates understanding: Yes    The patient's current medical problems were reviewed.    1. Chronic bronchiectasis - and history of MAC infection  Not progressing, not needing azithromycin very often - three times this year.  NOYOLA noted with vigorous hiking, but not with everyday activities. Seems clinically stable.     2. Osteoporosis  Ongoing calcium and vitamin D.  No unusual bone pain or kyphosis, does not want additional treatment.     3. Screening for hyperlipidemia  She wants to know her results.  May not be willing to take medications.  She has thought that 'cholesterol' was causing her NOYOLA, perhaps.  She is not having chest pain.     4. Visual disturbance  She has noted visual defect in outer, upper visual field when lying flat.  Her ophthalmologist is evaluating further with MRI, upcoming. She does not describe TIA or arm or leg or other neurologic symptoms.  Will follow-up pending this evaluation.     I have had an Advance Directives discussion with the patient.  The following health maintenance schedule was reviewed with the patient and provided in printed form in the after visit summary:   Health Maintenance   Topic Date Due     ZOSTER VACCINES (1 of 2) 05/14/1990     PNEUMOCOCCAL IMMUNIZATION 65+ LOW/MEDIUM RISK (2 of 2 - PPSV23) 10/14/2017     MEDICARE ANNUAL WELLNESS VISIT  09/22/2021     FALL RISK ASSESSMENT  09/22/2021     DXA SCAN  11/19/2021     LIPID  12/14/2023     ADVANCE CARE PLANNING  12/14/2023     TD 18+ HE  01/01/2024     INFLUENZA VACCINE RULE BASED  Completed     HEPATITIS B VACCINES  Aged Out      Subjective:   Chief Complaint: Neisha Malcolm is an 80 y.o. female here for an Annual Wellness visit.   HPI:  She has been well. Worried about 'cholesterol' as she noted more NOYOLA recently with vigorous hiking.  No increase in NOYOLA with everyday living activity.  No unusual cough or fever.  Has used  azithromycin ZPak 3 times this year.  No hemoptysis, or increase in chronic cough and sputum.  Appetite is good, weight stable, no unusual pain.      Review of Systems: Please see above.  The rest of the review of systems are negative for all systems.    Patient Care Team:  Mukund Martin MD as PCP - General (Internal Medicine)    Patient Active Problem List   Diagnosis     Bronchiectasis (H)     History of schizophrenia     Asthenia     Mental health disorder     Osteoporosis     Past Medical History:   Diagnosis Date     Asthenia      Osteoporosis      Weight loss       Past Surgical History:   Procedure Laterality Date     APPENDECTOMY       DILATION AND CURETTAGE OF UTERUS       HYSTERECTOMY      unilateral oophorectomy     VAGINAL DELIVERY  1963     x 2; son and daughter; '63 and '66      Family History   Problem Relation Age of Onset     Cancer Mother      Hypertension Mother      Stroke Father       Social History     Socioeconomic History     Marital status: Single     Spouse name: Not on file     Number of children: 2     Years of education: Not on file     Highest education level: Not on file   Occupational History     Occupation: pastoral care/LPN     Comment: retired   Social Needs     Financial resource strain: Not on file     Food insecurity     Worry: Not on file     Inability: Not on file     Transportation needs     Medical: Not on file     Non-medical: Not on file   Tobacco Use     Smoking status: Never Smoker     Smokeless tobacco: Never Used   Substance and Sexual Activity     Alcohol use: No     Drug use: No     Sexual activity: Not on file   Lifestyle     Physical activity     Days per week: Not on file     Minutes per session: Not on file     Stress: Not on file   Relationships     Social connections     Talks on phone: Not on file     Gets together: Not on file     Attends [employer] service: Not on file     Active member of club or organization: Not on file     Attends meetings of clubs or  "organizations: Not on file     Relationship status: Not on file     Intimate partner violence     Fear of current or ex partner: Not on file     Emotionally abused: Not on file     Physically abused: Not on file     Forced sexual activity: Not on file   Other Topics Concern     Not on file   Social History Narrative    Moved here from [place]/ area to be close to daughter ~2016 ;Kristina-she works in financial office at [employer]     .Grew up in [facility] [place] 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 Chronic bronchiectasis/asthenia/late onset schizophrenia- stable past few decades.    Goes to [employer]      Current Outpatient Medications   Medication Sig Dispense Refill     ascorbic acid, vitamin C, (VITAMIN C) 100 MG tablet Take 100 mg by mouth daily.       aspirin 325 MG tablet Take 325 mg by mouth 2 (two) times a week.       b complex vitamins tablet Take 1 tablet by mouth daily.       chlorproMAZINE (THORAZINE) 25 MG tablet TAKE 1 TABLET BY MOUTH EVERYDAY AT BEDTIME 30 tablet 4     cholecalciferol, vitamin D3, (VITAMIN D3 ORAL) Take by mouth.       multivitamin therapeutic tablet Take 1 tablet by mouth daily.       phytonadione, vit K1, (PHYTONADIONE, VITAMIN K1,) 100 mcg tablet Take 100 mcg by mouth daily.       vitamin A 8000 UNIT capsule Take 8,000 Units by mouth daily.       vitamin E 200 UNIT capsule Take 200 Units by mouth daily.       Objective:   Vital Signs:   Visit Vitals  /74 (Patient Site: Left Arm, Patient Position: Sitting, Cuff Size: Adult Regular)   Pulse 84   Ht 5' 3\" (1.6 m)   Wt 130 lb (59 kg)   SpO2 98%   BMI 23.03 kg/m       PHYSICAL EXAM:  General Appearance: In no acute distress  /74 (Patient Site: Left Arm, Patient Position: Sitting, Cuff Size: Adult Regular)   Pulse 84   Ht 5' 3\" (1.6 m)   Wt 130 lb (59 kg)   SpO2 98%   BMI 23.03 kg/m    EYES: Clear   HEENT: nose and throat clear, ears normal   NECK:  without cervical or " axillary adenopathy  RESPIRATORY: Clear  ABDOMEN: soft, flat, and non-tender, without mass, rebound, or guarding  EXTREMITIES: No joint swelling, no ulcer or edema, there is no kyphosis  NEUROLOGIC: No arm or leg  weakness, speech is clear, gait is normal  PSYCHIATRIC: Oriented X 3, without confusion, behavior and affect normal, thinking is clear    Assessment Results 9/22/2020   Activities of Daily Living No help needed   Instrumental Activities of Daily Living No help needed   Get Up and Go Score -   Mini Cog Total Score 5   Some recent data might be hidden     A Mini-Cog score of 0-2 suggests the possibility of dementia, score of 3-5 suggests no dementia    Identified Health Risks:     She is at risk for lack of exercise and has been provided with information to increase physical activity for the benefit of her well-being.  Information on urinary incontinence and treatment options given to patient.  She is at risk for falling and has been provided with information to reduce the risk of falling at home.  Patient's advanced directive was discussed and I am comfortable with the patient's wishes.

## 2021-06-12 NOTE — PROGRESS NOTES
"Visit: The patient's condition can be safely assessed and treated via synchronous audio and visual telemedicine encounter.       Reason for Telemedicine Visit: Patient has requested telehealth visit     Originating Site (Patient Location): Patient's home     Distant Site (Provider Location): [facility]: [facility]     Consent:  The patient/guardian has verbally consented to: the potential risks and benefits of telemedicine (video visit) versus in person care; bill my insurance or make self-payment for services provided; and responsibility for payment of non-covered services.      Mode of Communication:  Video Conference via [employer]     As the provider I attest to compliance with applicable laws and regulations related to telemedicine.            HPI:   Neisha Malcolm is a 80y.o. female with history of schizophrenia, paranoia, here for follow-up visit.  Last seen in June. She had been stable on Thorazine.   Pt reports she is doing well on this medication.  Denies side effects.         ROS: decrease vision right eye. Denies psychosis, depression, anxiety. No problems with sleep or appetite.   10 point ROS was negative except for the items listed in HPI.      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  She describes herself as a \"risk taker\".  Bought a keyboard, plays an hour each day.     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           I have reviewed and updated the patient's Past Medical History, Social History, Family History and Medication List.       Current Outpatient Medications:      ascorbic acid, vitamin C, (VITAMIN C) 100 MG tablet, Take 100 mg by mouth daily., Disp: , Rfl:      aspirin 325 MG tablet, Take 325 mg by mouth 2 (two) times a week., Disp: , Rfl:      b complex vitamins tablet, Take 1 tablet by mouth daily., Disp: , Rfl:      chlorproMAZINE (THORAZINE) 25 " MG tablet, TAKE 1 TABLET BY MOUTH EVERYDAY AT BEDTIME, Disp: 30 tablet, Rfl: 4     cholecalciferol, vitamin D3, (VITAMIN D3 ORAL), Take by mouth., Disp: , Rfl:      multivitamin therapeutic tablet, Take 1 tablet by mouth daily., Disp: , Rfl:      phytonadione, vit K1, (PHYTONADIONE, VITAMIN K1,) 100 mcg tablet, Take 100 mcg by mouth daily., Disp: , Rfl:      vitamin A 8000 UNIT capsule, Take 8,000 Units by mouth daily., Disp: , Rfl:      vitamin E 200 UNIT capsule, Take 200 Units by mouth daily., Disp: , Rfl:                ALLERGIES  Gluten and Peanut           MSE:      Vital signs: not currently breastfeeding.     Alert & oriented x 3.   Appearance: Appears stated age, casually dressed.  Speech: Normal rate, rhythm and tone.  Gait: Normal.  Musculoskeletal: Normal strength, no abnormal movements.  Mood/Affect: Neutral.  Thought Process: Normal rate, logical.  Thought Content: No suicide or homicide ideation.  Associations: Intact, no delusions.  Perceptions: No hallucinations.  Memory: recent and remote memory intact.  Attention span and concentration: normal.  Language: Intact.  Fund of Knowledge: Normal.  Insight and Judgement: Adequate.           Impression:      1.  Schizophrenia spectrum disorder- stable.  2. Cognitive status-no cognitive complaints at this time.  Declined  eval and neuropsych testing in 2/2020.        Plan:     1. Continue meds as above, F/u 3 mos or prn.  2. Head MRI next week for vision loss.        Total Time and Coordination of Care:       25 Minutes spent in the care of this patient with >50% of this time was spent in   Face-to-face counseling specifically discussing and educating about the pharmacologic and psycho-social treatment options, the risks, benefits and side effects of medication, patient goals and providing supportive therapy.      High risk decision making secondary to drug therapy requiring ongoing lab monitoring. Additional tests such as EKG, potential for multiple  interactions, requiring special neuroleptic consent and education of the patient.           Meaghan Welsh MD         Start Time 9:00 AM  End Time 9:28 AM     Phone call duration: 28 minutes

## 2021-06-13 NOTE — PROGRESS NOTES
Assessment and Plan:   Remarkably high functioning given overall situation with history of significant mental health problems.  Moved from [place] a few years ago and she seems to be very  comfortable with her current life situation.  Eccentric odd ideas about digestion and other issues but no major symptoms.  She is very fixed in her ideas and does not want prescription medication for vitamin D    1. Health maintenance examination      2. Hemorrhoids    - hydrocortisone 25 mg suppository; Insert 1 suppository (25 mg total) into the rectum every 12 (twelve) hours.  Dispense: 12 suppository; Refill: 11    3. Bronchiectasis without complication  Remarkably improved symptoms in comparison when she lived in [place].  She does not like the cold weather here but she does not seem to have much in the way of exacerbations of her chronic lung symptoms    4. Vitamin D deficiency  Not interested in taking medicine for this is very explicit      The patient's current medical problems were reviewed.    I have had an Advance Directives discussion with the patient.  The following health maintenance schedule was reviewed with the patient and provided in printed form in the after visit summary:   Health Maintenance   Topic Date Due     ZOSTER VACCINE  05/14/2000     PNEUMOCOCCAL POLYSACCHARIDE VACCINE AGE 65 AND OVER  05/14/2005     INFLUENZA VACCINE RULE BASED (1) 08/01/2017     DXA SCAN  09/08/2018     FALL RISK ASSESSMENT  10/05/2018     ADVANCE DIRECTIVES DISCUSSED WITH PATIENT  10/13/2021     TD 18+ HE  01/01/2024     PNEUMOCOCCAL CONJUGATE VACCINE FOR ADULTS (PCV13 OR PREVNAR)  Completed        Subjective:   Chief Complaint: Neisha Malcolm is an 77 y.o. female here for an Annual Wellness visit.   HPI: She is here today with her daughter blood tests were done last week everything is fairly stable vitamin D level is low but she makes it clear she is not interested in taking it.  Is a chronic history of significant  psychiatric disease and previous schizophrenia currently this does not seem to be active although she is very odd and personality and eccentric.  Describes her gastrointestinal function in great detail but nothing sounds pathologic she does suck struggle with some hemorrhoids no blood in her stools no pain issues remarkably improved pulmonary status from when she lives in [place] several years ago she attributes it to the  air here in [place].  Daughter Kristina is her primary caregiver and she is pleased with current state of mother's health.  Will try hemorrhoid suppositories she is not interested in diagnostic tests for this which is not surprising    Review of Systems:    Please see above.  The rest of the review of systems are negative for all systems.    Patient Care Team:  Mukund Leonard MD as PCP - General (Internal Medicine)     Patient Active Problem List   Diagnosis     Bronchiectasis     History of schizophrenia     Asthenia     Past Medical History:   Diagnosis Date     Asthenia      Weight loss       Past Surgical History:   Procedure Laterality Date     APPENDECTOMY       DILATION AND CURETTAGE OF UTERUS       HYSTERECTOMY      unilateral oophorectomy     VAGINAL DELIVERY  1963     x 2; son and daughter; '63 and '66      Family History   Problem Relation Age of Onset     Cancer Mother      Hypertension Mother      Stroke Father       Social History     Social History     Marital status: Single     Spouse name: N/A     Number of children: 2     Years of education: N/A     Occupational History     pastoral care/LPN      retired     Social History Main Topics     Smoking status: Never Smoker     Smokeless tobacco: Not on file     Alcohol use No     Drug use: Not on file     Sexual activity: Not on file     Other Topics Concern     Not on file     Social History Narrative    Grew up in [place]  lived in [facility] then  then moved to [place] to be  in 2000 for  "wyatt (son). Moved back to be near daughter Shante in 2016. Non smoker/non drinker. [employer] then  [employer]. 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 Hx late onset schizophrenia with crisis about 25 yrs ago in [facility]. Stable past 20 yrs no meds or psych care (hx per daughter)      Current Outpatient Prescriptions   Medication Sig Dispense Refill     ascorbic acid, vitamin C, (VITAMIN C) 100 MG tablet Take 100 mg by mouth daily.       b complex vitamins tablet Take 1 tablet by mouth daily.       cyanocobalamin (VITAMIN B-12) 1000 MCG tablet Take 1,000 mcg by mouth daily.       ergocalciferol (ERGOCALCIFEROL) 50,000 unit capsule One weekly for vitamin D replacement. 12 capsule 6     GINSENG ORAL Take by mouth daily.       IRON,CARBONYL/VIT C/VIT B12/FA (IRON 100 PLUS ORAL) Take by mouth.       multivitamin capsule Take 1 capsule by mouth daily.       vitamin A 8000 UNIT capsule Take 8,000 Units by mouth daily.       vitamin E 200 UNIT capsule Take 200 Units by mouth daily.       hydrocortisone 25 mg suppository Insert 1 suppository (25 mg total) into the rectum every 12 (twelve) hours. 12 suppository 11     No current facility-administered medications for this visit.       Objective:   Vital Signs:   Visit Vitals     /68 (Patient Site: Left Arm, Patient Position: Sitting, Cuff Size: Adult Regular)     Pulse 78     Ht 5' 2.5\" (1.588 m)     Wt 113 lb (51.3 kg)     BMI 20.34 kg/m2        VisionScreening:  No exam data present     PHYSICAL EXAM  She is cachectic she wears a bandanna basically has alopecia and casual inspection she looks like somebody that is on chemotherapy but of course this is not the case  HEENT unremarkable  Lungs clear to auscultation  Heart regular rhythm  Breasts  not examined  Abdomen benign  Extremities no edema trace pedal pulses   Neurologic eccentric affect but certainly more comfortable now than when we first met a few years ago.  Smiles more frequently she is very set " in her ideation and really does not want any advice.  Gait is okay cranial nerves okay    Assessment Results 10/5/2017   Activities of Daily Living No help needed   Instrumental Activities of Daily Living No help needed   Get Up and Go Score Less than 12 seconds   Mini Cog Total Score 5     A Mini-Cog score of 0-2 suggests the possibility of dementia, score of 3-5 suggests no dementia    Identified Health Risks:     The patient was counseled and encouraged to consider modifying their diet and eating habits. She was provided with information on recommended healthy diet options.  The patient was provided with written information regarding signs of hearing loss.  She is at risk for falling and has been provided with information to reduce the risk of falling at home.  Patient's advanced directive was discussed and I am comfortable with the patient's wishes.      Recent Results (from the past 240 hour(s))   Comprehensive Metabolic Panel   Result Value Ref Range    Sodium 137 136 - 145 mmol/L    Potassium 3.8 3.5 - 5.0 mmol/L    Chloride 102 98 - 107 mmol/L    CO2 28 22 - 31 mmol/L    Anion Gap, Calculation 7 5 - 18 mmol/L    Glucose 83 70 - 125 mg/dL    BUN 10 8 - 28 mg/dL    Creatinine 0.69 0.60 - 1.10 mg/dL    GFR MDRD Af Amer >60 >60 mL/min/1.73m2    GFR MDRD Non Af Amer >60 >60 mL/min/1.73m2    Bilirubin, Total 0.5 0.0 - 1.0 mg/dL    Calcium 9.3 8.5 - 10.5 mg/dL    Protein, Total 7.0 6.0 - 8.0 g/dL    Albumin 3.7 3.5 - 5.0 g/dL    Alkaline Phosphatase 74 45 - 120 U/L    AST 24 0 - 40 U/L    ALT 19 0 - 45 U/L   HM2(CBC w/o Differential)   Result Value Ref Range    WBC 3.7 (L) 4.0 - 11.0 thou/uL    RBC 4.34 3.80 - 5.40 mill/uL    Hemoglobin 13.6 12.0 - 16.0 g/dL    Hematocrit 39.9 35.0 - 47.0 %    MCV 92 80 - 100 fL    MCH 31.4 27.0 - 34.0 pg    MCHC 34.1 32.0 - 36.0 g/dL    RDW 10.9 (L) 11.0 - 14.5 %    Platelets 233 140 - 440 thou/uL    MPV 7.4 7.0 - 10.0 fL   Vitamin D, Total (25-Hydroxy)   Result Value Ref Range     Vitamin D, Total (25-Hydroxy) 18.9 (L) 30.0 - 80.0 ng/mL   Lipid Cascade   Result Value Ref Range    Cholesterol 234 (H) <=199 mg/dL    Triglycerides 127 <=149 mg/dL    HDL Cholesterol 69 >=50 mg/dL    LDL Calculated 140 (H) <=129 mg/dL    Patient Fasting > 8hrs? Yes    Urinalysis-UC if Indicated   Result Value Ref Range    Color, UA Yellow Colorless, Yellow, Straw, Light Yellow    Clarity, UA Clear Clear    Glucose, UA Negative Negative    Bilirubin, UA Negative Negative    Ketones, UA Negative Negative    Specific Gravity, UA 1.010 1.005 - 1.030    Blood, UA Trace (!) Negative    pH, UA 6.0 5.0 - 8.0    Protein, UA Negative Negative mg/dL    Urobilinogen, UA 0.2 E.U./dL 0.2 E.U./dL, 1.0 E.U./dL    Nitrite, UA Negative Negative    Leukocytes, UA Negative Negative    Bacteria, UA Few (!) None Seen hpf    RBC, UA 0-2 None Seen, 0-2 hpf    WBC, UA None Seen None Seen, 0-5 hpf    Squam Epithel, UA 0-5 None Seen, 0-5 lpf   Vitamin B12   Result Value Ref Range    Vitamin B-12 1327 (H) 213 - 816 pg/mL   Thyroid Stimulating Hormone (TSH)   Result Value Ref Range    TSH 1.26 0.30 - 5.00 uIU/mL

## 2021-06-14 NOTE — PROGRESS NOTES
________________________________________  Medications Phoned  to Pharmacy [] yes [x]no  Name of Pharmacist:  List Medications, including dose, quantity and instructions    Medications ordered this visit were e-scribed.  Verified by order class [x] yes  [] no   Thorazine 25 mg  Medication changes or discontinuations were communicated to patient's pharmacy: [] yes  [x] no    Dictation completed at time of chart check: [x] yes  [] no    I have checked the documentation for today s encounters and the above information has been reviewed and completed.

## 2021-06-14 NOTE — PROGRESS NOTES
[facility] Follow Up Note    Neisha Malcolm   77 y.o. female    Date of Visit: 12/1/2017    Chief Complaint   Patient presents with     Chest Pain     left side chest pain. pt thinks she have pneumonia due to fullness feeling in left lung region     Referral     to mental health     Subjective  Neisha comes in today as she is pretty sure she has pneumonia.  She is Dr. Leonard's patient.  She has a history of bronchiectasis and knows when she is getting an infection.  She does not wish to have a chest x-ray but started having left-sided chest pain and a cough productive of yellow to greenish phlegm and a fullness feeling in her left lung region.  She has a history of schizophrenia was on Mellaril years ago and would like to get reestablished with a psychiatrist that she feels like she is having an exacerbation.  She like something to help calm her mind down.    ROS A comprehensive review of systems was performed and was otherwise negative    Social History:   Social History     Social History Narrative    Grew up in [place]  lived in [facility] then  then moved to [place] area to be [facility] in 2000 for wyatt (son). Moved back to be near daughter Shante in 2016. Non smoker/non drinker. [employer] then [employer]. 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 Hx late onset schizophrenia with crisis about 25 yrs ago in [facility]. Stable past 20 yrs no meds or psych care (hx per daughter)       Medications were reconciled.  Allergies, social and family history, and the problem list were all reviewed and updated.    Old records reviewed: Records from Dr. Leonard    Exam  General Appearance: Pleasant and alert  Vitals:    12/01/17 1035   BP: 126/66   Patient Site: Left Arm   Patient Position: Sitting   Cuff Size: Adult Regular   Pulse: 83   Weight: 108 lb (49 kg)      Body mass index is 19.75 kg/(m^2).  Wt Readings from Last 3 Encounters:   12/01/17 108 lb (49 kg)   10/12/17 114 lb  (51.7 kg)   10/05/17 113 lb (51.3 kg)     HEENT: Sclera are clear.   Lungs: Normal respirations, popping noises at both lungs posteriorly.  Cardiac: Regular rate and rhythm  Abdomen: Soft and nondistended  Extremities: No edema  Skin: No rashes  Neuro: Moves all extremities and has facial symmetry  Gait: Ambulates with a normal gait    Assessment/Plan  1. Pneumonia due to infectious organism, unspecified laterality, unspecified part of lung  She declines a chest x-ray.  Will empirically treat her with levofloxacin for 7 days.    2. Need for pneumococcal vaccination  Is to wait on the next pneumonia vaccine.    3. Chest pain  To let us know if things are not improving with treatment.    4. History of schizophrenia  After some discussion, we will start her on olanzapine 5 mg at bedtime and she can increase this up to 10 mg on her own and will get her established with psychiatry.  - Ambulatory referral to Psychiatry          Coby Bradshaw MD  12/1/2017    Much or all of the text in this note was generated through the use of Dragon Dictate voice-to-text software. Errors in spelling or words which seem out of context are unintentional. Sound alike errors, in particular, may have escaped editing.

## 2021-06-14 NOTE — TELEPHONE ENCOUNTER
Reason for Call:  Other call back       Detailed comments: client called stating Her pharmacy:   [facility]  [phone]   Pharmacy Address and Hours    Address Hours   [address]   [place] [address]      Can Not fill order for:   chlorproMAZINE (THORAZINE) 25 MG tablet that medication is not avail   They need alternative med.       Phone Number Patient can be reached at: Home number on file [phone] (home)   Please contact client with outcome.     Best Time: ASAP    Can we leave a detailed message on this number?: Yes    Call taken on 1/11/2021 at 10:48 AM by Cy Michel

## 2021-06-14 NOTE — PROGRESS NOTES
".  Neisha Malcolm is a 80 y.o. female who is being evaluated via a billable telemedicine visit.    How would you like to obtain your AVS? AVS Preference: Mail a copy.       ASSESSMENT/PLAN:    ICD-10-CM    1. History of schizophrenia  Z86.59      Discussed risks/benefits of medication including cognitive, CV and TD effects.  Denies s/e's.    RN to check DISCUS after COVID  Discus: 9-10-19 (0)       Return in about 3 months (around 4/5/2021).      HPI: Neisha Malcolm is a 80 y.o. female with history of schizophrenia, and paranoia, here for follow-up visit.  Last seen in Oct. Has been stable on Thorazine. Pt reports she is doing well on this medication.     Pt reports head scans done at [facility] showed artery which affects vision R eye when lying down.    Occasionally drives during daylight (has night blindness).    Exercise- walking around house.    2 women at [employer]- due in late Jan-Feb.     Improved family relationships over the holidays.       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  She describes herself as a \"risk taker\".  Bought a keyboard, plays an hour each day.     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      Patient's problem list, medication list, allergies, past medical history, surgical history, family history and social history were reviewed.        ROS:  See HPI.  Mood stable, sleep/appetite good. Denies memory problems, SI/manic/psychotic sx. 10-point ROS otherwise negative.          Current Outpatient Medications:      ascorbic acid, vitamin C, (VITAMIN C) 100 MG tablet, Take 100 mg by mouth daily., Disp: , Rfl:      aspirin 325 MG tablet, Take 325 mg by mouth 2 (two) times a week., Disp: , Rfl:      b complex vitamins tablet, Take 1 tablet by mouth daily., Disp: , Rfl:      chlorproMAZINE (THORAZINE) 25 MG tablet, TAKE 1 TABLET BY MOUTH EVERYDAY AT BEDTIME, Disp: 30 tablet, Rfl: 4     " cholecalciferol, vitamin D3, (VITAMIN D3 ORAL), Take by mouth., Disp: , Rfl:      multivitamin therapeutic tablet, Take 1 tablet by mouth daily., Disp: , Rfl:      phytonadione, vit K1, (PHYTONADIONE, VITAMIN K1,) 100 mcg tablet, Take 100 mcg by mouth daily., Disp: , Rfl:      vitamin A 8000 UNIT capsule, Take 8,000 Units by mouth daily., Disp: , Rfl:      vitamin E 200 UNIT capsule, Take 200 Units by mouth daily., Disp: , Rfl:         MSE:      Alert & oriented x 3.   Appearance: Appears stated age, casually dressed.  Speech: Normal rate, rhythm and tone.  Gait: Normal.  Musculoskeletal: Normal strength, no abnormal movements.  Mood/Affect: Neutral.  Thought Process: Normal rate, logical.  Thought Content: No suicide or homicide ideation.  Associations: Intact, no delusions.  Perceptions: No hallucinations.  Memory: recent and remote memory intact.  Attention span and concentration: normal.  Language: Intact.  Fund of Knowledge: Normal.  Insight and Judgement: Adequate.         Current Outpatient Medications:      ascorbic acid, vitamin C, (VITAMIN C) 100 MG tablet, Take 100 mg by mouth daily., Disp: , Rfl:      aspirin 325 MG tablet, Take 325 mg by mouth 2 (two) times a week., Disp: , Rfl:      b complex vitamins tablet, Take 1 tablet by mouth daily., Disp: , Rfl:      chlorproMAZINE (THORAZINE) 25 MG tablet, TAKE 1 TABLET BY MOUTH EVERYDAY AT BEDTIME, Disp: 30 tablet, Rfl: 4     cholecalciferol, vitamin D3, (VITAMIN D3 ORAL), Take by mouth., Disp: , Rfl:      multivitamin therapeutic tablet, Take 1 tablet by mouth daily., Disp: , Rfl:      phytonadione, vit K1, (PHYTONADIONE, VITAMIN K1,) 100 mcg tablet, Take 100 mcg by mouth daily., Disp: , Rfl:      vitamin A 8000 UNIT capsule, Take 8,000 Units by mouth daily., Disp: , Rfl:      vitamin E 200 UNIT capsule, Take 200 Units by mouth daily., Disp: , Rfl:        ALLERGIES  Gluten and Peanut      Start Time 9:00 AM   End Time 9:30 AM        Meaghan Welsh  MD [facility] MENTAL HEALTH & ADDICTION SERVICES

## 2021-06-14 NOTE — TELEPHONE ENCOUNTER
Will route to provider for review as an alternative medications is needed for thorazine as it is not currently available.

## 2021-06-15 ENCOUNTER — OFFICE VISIT - HEALTHEAST (OUTPATIENT)
Dept: BEHAVIORAL HEALTH | Facility: CLINIC | Age: 81
End: 2021-06-15

## 2021-06-15 DIAGNOSIS — F20.0 PARANOID SCHIZOPHRENIA (H): ICD-10-CM

## 2021-06-15 DIAGNOSIS — Z86.59 HISTORY OF SCHIZOPHRENIA: ICD-10-CM

## 2021-06-15 DIAGNOSIS — F43.22 ADJUSTMENT DISORDER WITH ANXIETY: ICD-10-CM

## 2021-06-15 DIAGNOSIS — Z79.899 HIGH RISK MEDICATION USE: ICD-10-CM

## 2021-06-15 NOTE — ASSESSMENT & PLAN NOTE
Pt did not come in to clinic today for scheduled DISCUS.  The risks and benefits of medication discussed with the pt and dtr including metabolic s/e's, TD, CV risk.  Pt endorsed occ movements of the mouth.  Says they might be related to a partial denture.  No movements seen on exam.  Daughter has not noticed any.  Plan:    Next visit, DISCUS.   Pt to see dentist.  Offered to do labs- pt declined, will get done at PCP appt.

## 2021-06-15 NOTE — ASSESSMENT & PLAN NOTE
"Resolved now that she's moved out of [facility] and retired. Pt stated: \"March to May were the most stressful time in the past 20 years\".      "

## 2021-06-16 PROBLEM — F43.22 ADJUSTMENT DISORDER WITH ANXIETY: Status: ACTIVE | Noted: 2021-04-06

## 2021-06-16 PROBLEM — Z79.899 HIGH RISK MEDICATION USE: Status: ACTIVE | Noted: 2021-04-06

## 2021-06-16 PROBLEM — H53.9 VISUAL DISTURBANCE: Status: ACTIVE | Noted: 2020-09-22

## 2021-06-16 PROBLEM — R76.11 MANTOUX: POSITIVE: Status: ACTIVE | Noted: 2020-09-22

## 2021-06-16 PROBLEM — A31.0 MYCOBACTERIUM AVIUM INFECTION (H): Status: ACTIVE | Noted: 2020-09-22

## 2021-06-16 NOTE — TELEPHONE ENCOUNTER
Pt called stating [facility] is going to send over a request to switch to this pharmacy. Pt states that this is ok and to accept the request.

## 2021-06-16 NOTE — TELEPHONE ENCOUNTER
"Dot called this morning and was adamant that she needs to speak with a nurse for Dr Welsh \"regarding my up-coming appt on 5/18\".  W advised her that the appt on that date had been cancelled, and read her thr note that provider wished tiffanie see her in June.  Dot was angry about this, stating that this was done in error, that this is the 4th time she has called about this issue and the nurse has not called her back.  She would like to speak with someone as soon as possible.  "

## 2021-06-16 NOTE — TELEPHONE ENCOUNTER
Reason for call:  4/14/21 Received call from patient, would like a call from the nurse regarding upcoming appointment that she said is scheduled     Phone number to reach patient: [phone]    Best Time:  Anytime     Can we leave a detailed message on this number? Yes
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
## 2021-06-16 NOTE — PROGRESS NOTES
"Neisha Malcolm is a 80 y.o. female who is being evaluated via a billable telemedicine visit.    How would you like to obtain your AVS? AVS Preference: Mail a copy.      Medical Decision Making:     Assessment & Plan   Problem List Items Addressed This Visit        Psychiatry Problems    Adjustment disorder with anxiety     Family pressuring her to take care of glaucoma.  Had stressful 9 months with previous ladies. Current ladies are \"sweet\".  Moving out of [facility] late May. Has own apt.  Reports mood stable.             Other    History of schizophrenia     Pt reports: \"In Dec, God told me:  'Do not get treatment on your eyes'.   He wants me to go blind.  He is going to give me a gift.  I have glaucoma, but will not get it treated.\"         Visual disturbance     Darkness right eye. Referred to ophthalmology for glaucoma, blood vessel right eye and cataract.         High risk medication use     The risks and benefits of medication discussed with the pt including metabolic s/e's, TD, CV risk.  Today, pt endorsed involuntary movements of the mouth.  Says they are minimal.  She will ask her daughter to evaluate. Schedule in person DISCUS.                    Return in about 2 months (around 6/6/2021).    Meaghan Welsh MD  [facility] MENTAL HEALTH & ADDICTION SERVICES        Plan:      Thought disorder-patient reports she is taking Thorazine, able to care for herself and make her own decisions.  She denies delusions,  although discussed family's distress about her belief that God does not want the glaucoma treated.  She said: \"I do not want to go blind either\". I encouraged her to pray to God and ask for guidance on this decision which causing distress in herself and family.     Reviewed risks/benefits of medication with patient including TD.  She agreed to come in for DISCUS.       History of Present Illness:   Neisha Malcolm is a 80 y.o. female here with history of " schizophrenia, and paranoia, here for follow-up visit.  Last seen in Jan. Has been stable on Thorazine. Pt reports she is doing well, mood is stable, sleep/appetite good, denies hallucinations.    She intends to move back home in May.  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  Plays the piano and keyboard.   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        ROS:  Denies SI/manic/psychotic sx.  See HPI, otherwise negative.      Patient's problem list, medication list, allergies, past medical history, surgical history, family history and social history were reviewed.             Current Outpatient Medications:      ascorbic acid, vitamin C, (VITAMIN C) 100 MG tablet, Take 100 mg by mouth daily., Disp: , Rfl:      aspirin 325 MG tablet, Take 325 mg by mouth 2 (two) times a week., Disp: , Rfl:      b complex vitamins tablet, Take 1 tablet by mouth daily., Disp: , Rfl:      chlorproMAZINE (THORAZINE) 25 MG tablet, TAKE 1 TABLET BY MOUTH EVERYDAY AT BEDTIME, Disp: 30 tablet, Rfl: 4     cholecalciferol, vitamin D3, (VITAMIN D3 ORAL), Take by mouth., Disp: , Rfl:      multivitamin therapeutic tablet, Take 1 tablet by mouth daily., Disp: , Rfl:      phytonadione, vit K1, (PHYTONADIONE, VITAMIN K1,) 100 mcg tablet, Take 100 mcg by mouth daily., Disp: , Rfl:      vitamin A 8000 UNIT capsule, Take 8,000 Units by mouth daily., Disp: , Rfl:      vitamin E 200 UNIT capsule, Take 200 Units by mouth daily., Disp: , Rfl:     See epic      MSE:      Vital signs: not currently breastfeeding.    Alert & oriented x 3.   Appearance: Not observed  Speech: Normal rate, rhythm and tone.  Gait: Not observed  Musculoskeletal: Not observed  Mood/Affect: Neutral.  Thought Process: Normal rate, logical.  Thought Content: No suicide or homicide ideation.  Associations: Received message from  God.  Perceptions: Denies hallucinations.  Memory: recent and remote memory intact, not formally tested  Attention span and concentration: normal, not formally tested  Language: Intact.  Fund of Knowledge: Normal.  Insight and Judgement: fair         Telemedicine Visit Details  Type of service: Telephone Visit    Originating Location (pt. Location): Home    Distant Location (provider location):  [facility] HEALTH & ADDICTION SERVICES     Platform used for Video Visit: Other: phone     This visit started at: 10 AM and concluded at: 10:30 AM     Total time spent on this encounter, on the date of service, including pre-visit review of separately obtained history, face-to-face interaction performing medically appropriate physical exam, patient counseling/education, interpretation of diagnostic results, care coordination and documentation was 40 minutes.            Meaghan Welsh MD

## 2021-06-16 NOTE — TELEPHONE ENCOUNTER
LM for patient to call the clinic back.  Also let her know she does need to make a follow up appointment around 6-6-21 per Dr. Welsh.

## 2021-06-16 NOTE — TELEPHONE ENCOUNTER
Reason for call:  Other Patient called regarding    (reason for call): call back     Additional comments: Pt stated that the previous message was not properly answered in communication, pt states she knows the time and day of the appt she wants to know what the visit is about, what will happen, what will occur. Stated she would like an electrocardiogram. Writer explained that Dr. BENJAMIN would not do that service but if she would like that, the request would be for her PCP.     Please relay the following information to the patient: Patient did not understand why they were scheduled for the telemed hub. When writer explained it is for people who aren't able to do a video visist she stated she could, confusing writer. Please confirm to this patient that her appt is via telehealth because she declined to get a mychart so she needs to come in person.     Phone number to reach patient:  [phone] (H)    Best Time:  Today    Can we leave a detailed message on this number? YES

## 2021-06-16 NOTE — TELEPHONE ENCOUNTER
Return in about 2 months (around 6/6/2021) per Dr. Welsh's note.     Patient was scheduled too soon.  Please reschedule patient. Patient was just seen on 4-6-21.

## 2021-06-16 NOTE — TELEPHONE ENCOUNTER
Reason for call:  Other Patient called regarding    (reason for call): call back   Additional comments: pt who states they wree unaware they have an appt scheduled on 5.18.2021 and want to know what the appt is for? Writer stated it is a normal follow up but they are wondering if there will be some testing or something.    Phone number to reach patient:  [phone] (H)    Best Time:  Today    Can we leave a detailed message on this number? YES

## 2021-06-16 NOTE — TELEPHONE ENCOUNTER
Patient called Intake, she said she was canceled from her May appt because she needs a test done. Assisted patient with getting another appt Sonia 15 at 10:30 am, pt will have telehub appt to get DISCUS completed by clinic staff and then have virtual appt with Dr Welsh. Explained to patient what the DISCUS exam was.

## 2021-06-16 NOTE — TELEPHONE ENCOUNTER
Called [facility] pharmacy and spoke with Elena,who will call or put through the transfer of the [facility] since patient already has refills on file at the [facility] pharmacy.

## 2021-06-17 NOTE — TELEPHONE ENCOUNTER
See related encounter dated 4/19/21. RN spoke to [facility] about switching thorazine script from [facility] to [facility]. Attempted to reach pt, but had to leave voicemail. Informed her that it was done last week and to check with [facility] pharmacy.

## 2021-06-17 NOTE — PROGRESS NOTES
Patient here today to initiate psychiatric care. Denies depression, denies SI/HI, denies anxiety. States sleep is real good. Daughter states she would like patient to know about when she feels better that medication should still be taken and /or the effects of her starting and stopping.

## 2021-06-17 NOTE — PROGRESS NOTES
Neishaандрей Sandraavery  1940      Assessment and Plan:  1.  Functional status is excellent she goes to [facility] she has a history of schizophrenia she is now on Zyprexa no major issues today; her daughter rKistina is pleased with her mom's health status  2 history of bronchiectasis and concerns about breathing which is chronic this is stable  3 chronic asthenia no change here appetite reasonably good      Chief Complaint: Follow-up    Visit diagnoses:    1. Bronchiectasis without complication     2. Mental health disorder     3. Asthenia       Patient Active Problem List   Diagnosis     Bronchiectasis     History of schizophrenia     Asthenia     Mental health disorder     Past Medical History:   Diagnosis Date     Asthenia      Weight loss      Past Surgical History:   Procedure Laterality Date     APPENDECTOMY       DILATION AND CURETTAGE OF UTERUS       HYSTERECTOMY      unilateral oophorectomy     VAGINAL DELIVERY  1963     x 2; son and daughter; '63 and '66     Social History     Social History     Marital status: Single     Spouse name: N/A     Number of children: 2     Years of education: N/A     Occupational History     pastoral care/LPN      retired     Social History Main Topics     Smoking status: Never Smoker     Smokeless tobacco: Never Used     Alcohol use No     Drug use: No     Sexual activity: Not on file     Other Topics Concern     Not on file     Social History Narrative    Moved here from [place] area to be close to daughter ~2016 ;Kristina-she works in financial office at [employer]     .Grew [place] in [place] [facility] went to [employer] in [facility]. 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 Chronic bronchiectasis/asthenia/late onset schizophrenia- stable past few decades.    Goes to [employer]     Family History   Problem Relation Age of Onset     Cancer Mother      Hypertension Mother      Stroke Father        Meds:  Current Outpatient Prescriptions   Medication Sig  Dispense Refill     ascorbic acid, vitamin C, (VITAMIN C) 100 MG tablet Take 100 mg by mouth daily.       b complex vitamins tablet Take 1 tablet by mouth daily.       calcium, as carbonate, (TUMS) 200 mg calcium (500 mg) chewable tablet Chew 1 tablet daily.       GINSENG ORAL Take by mouth 3 (three) times a week.        multivitamin therapeutic tablet Take 1 tablet by mouth daily.       OLANZapine (ZYPREXA) 5 MG tablet Take 1 tablet (5 mg total) by mouth at bedtime. 30 tablet 2     vitamin A 8000 UNIT capsule Take 8,000 Units by mouth daily.       vitamin E 200 UNIT capsule Take 200 Units by mouth daily.       No current facility-administered medications for this visit.        Allergies   Allergen Reactions     Gluten Diarrhea and Other (See Comments)     Also contispation     Peanut Swelling     GI swelling       ROS: complete review of symptoms otherwise negative except as noted below    S: She is here today with her daughter and she seems to be much more interactive she is actually smiling and joking than I recall.  No active concerns she is now on Zyprexa she is pleased with that she goes to mental health at [facility] and she is pleased with that.  Breathing has always been an issue and I suspect perhaps somewhat more than objective findings would suggest but her bronchiectasis has not been an issue this winter.  She misses [place] but is adjusting to [place] and seems to be doing extremely well all things considered and her daughter agrees       O:   Vitals:    05/09/18 1159   BP: 116/62   Patient Site: Left Arm   Patient Position: Sitting   Cuff Size: Adult Regular   Pulse: 71   SpO2: 98%   Weight: 124 lb 14.4 oz (56.7 kg)       Physical Exam:  General-she is easy to smile today; she wears her Mosque cross her history of pastoral care is noted  VS- see above  HEENT- neg   Neck- no adenopathy/thyromegaly/bruits  Chest- clear   Cor- reg no murmurs/gallops/ectopics  Extremities: no edema, good distal  pulses  Neuro- Cr. NN-  intact, alert, somewhat odd affect suggesting chronic mental illness however she is much more interactive today and even insightful    Lab Results   Component Value Date    CHOL 234 (H) 09/29/2017     Lab Results   Component Value Date    HDL 69 09/29/2017     Lab Results   Component Value Date    LDLCALC 140 (H) 09/29/2017     Lab Results   Component Value Date    TRIG 127 09/29/2017     No components found for: CHOLHDL  Results for orders placed or performed in visit on 09/29/17   Comprehensive Metabolic Panel   Result Value Ref Range    Sodium 137 136 - 145 mmol/L    Potassium 3.8 3.5 - 5.0 mmol/L    Chloride 102 98 - 107 mmol/L    CO2 28 22 - 31 mmol/L    Anion Gap, Calculation 7 5 - 18 mmol/L    Glucose 83 70 - 125 mg/dL    BUN 10 8 - 28 mg/dL    Creatinine 0.69 0.60 - 1.10 mg/dL    GFR MDRD Af Amer >60 >60 mL/min/1.73m2    GFR MDRD Non Af Amer >60 >60 mL/min/1.73m2    Bilirubin, Total 0.5 0.0 - 1.0 mg/dL    Calcium 9.3 8.5 - 10.5 mg/dL    Protein, Total 7.0 6.0 - 8.0 g/dL    Albumin 3.7 3.5 - 5.0 g/dL    Alkaline Phosphatase 74 45 - 120 U/L    AST 24 0 - 40 U/L    ALT 19 0 - 45 U/L     Lab Results   Component Value Date    WBC 5.2 10/12/2017    HGB 13.9 10/12/2017    HCT 40.4 10/12/2017    MCV 92 10/12/2017     10/12/2017         Mukund Leonard MD

## 2021-06-17 NOTE — TELEPHONE ENCOUNTER
Dot called asking that her prescription be transferred from [facility] to [facility].  NIRMALA attempted to  on communicating with the pharmacies for this, but she insits [facility] instructed her to have Dr eWlsh call them at [phone].

## 2021-06-17 NOTE — PROGRESS NOTES
Correct pharmacy verified with patient and confirmed in snapshot? [x] yes []no    Charge captured ? [x] yes  [] no    Medications Phoned  to Pharmacy [] yes [x]no  Name of Pharmacist:  List Medications, including dose, quantity and instructions      Medication Prescriptions given to patient   [] yes  [x] no   List the name of the drug the prescription was written for.       Medications ordered this visit were e-scribed.  Verified by order class [x] yes  [] no  Zyprexa 5 mg     Medication changes or discontinuations were communicated to patient's pharmacy: [] yes  [x] no    UA collected [] yes  [x] no    [place] Prescription Monitoring Program Reviewed? [x] yes  [] no   Blank    Referrals were made to: none    Future appointment was made: [x] yes  [] no    Dictation completed at time of chart check: [x] yes  [] no    I have checked the documentation for today s encounters and the above information has been reviewed and completed.

## 2021-06-17 NOTE — TELEPHONE ENCOUNTER
Reason for Call:  Medication or medication refill:    Do you use a [facility]?  Name of the pharmacy and phone number for the current request:   [facility]    Name of the medication requested:   chlorproMAZINE (THORAZINE) 25 MG tablet    Other request: Client called [facility] clinic needs to fax order for above medication to   [phone]    Can we leave a detailed message on this number? Yes    Phone number patient can be reached at: Home number on file [phone] (home)    Best Time: any     Call taken on 4/26/2021 at 12:06 PM by Cy Michel

## 2021-06-17 NOTE — TELEPHONE ENCOUNTER
Date of Last Office Visit: 4/6/21  Date of Next Office Visit: 6/15/21  No shows since last visit: 0  Cancellations since last visit: 0    Medication requested: chlorpromazine 25 mg Date last ordered: 1/5/21 Qty: 30 Refills: 4     Review of [place] ?: N/A    Lapse in medication adherence greater than 5 days?: No. Pt is transferring from [facility] to [facility] mail delivery pharmacy. [facility] would like a new script sent to them for their records.  If yes, call patient and gather details: N/A  Medication refill request verified as identical to current order?: Yes  Result of Last DAM, VPA, Li+ Level, CBC, or Carbamazepine Level (at or since last visit): N/A    []Medication refilled per  Medication Refill in Ambulatory Care  policy.  [x]Medication unable to be refilled by RN due to criteria not met as indicated below:    []Eligibility - not seen in the last year   []Supervision - no future appointment   []Compliance - no shows, cancellations or lapse in therapy   []Verification - order discrepancy   []Controlled medication   []Medication not included in policy   []90-day supply request   [x]Other - switching pharmacy, refills

## 2021-06-18 NOTE — PATIENT INSTRUCTIONS - HE
Patient Instructions by Mukund Martin MD at 9/22/2020  9:55 AM     Author: Mukund Martin MD Service: -- Author Type: Physician    Filed: 9/22/2020 10:24 AM Encounter Date: 9/22/2020 Status: Signed    : Mukund Martin MD (Physician)         Patient Education     Exercise for a Healthier Heart  You may wonder how you can improve the health of your heart. If youre thinking about exercise, youre on the right track. You dont need to become an athlete, but you do need a certain amount of brisk exercise to help strengthen your heart. If you have been diagnosed with a heart condition, your doctor may recommend exercise to help stabilize your condition. To help make exercise a habit, choose safe, fun activities.       Be sure to check with your health care provider before starting an exercise program.    Why exercise?  Exercising regularly offers many healthy rewards. It can help you do all of the following:    Improve your blood cholesterol levels to help prevent further heart trouble    Lower your blood pressure to help prevent a stroke or heart attack    Control diabetes, or reduce your risk of getting this disease    Improve your heart and lung function    Reach and maintain a healthy weight    Make your muscles stronger and more limber so you can stay active    Prevent falls and fractures by slowing the loss of bone mass (osteoporosis)    Manage stress better  Exercise tips  Ease into your routine. Set small goals. Then build on them.  Exercise on most days. Aim for a total of 150 or more minutes of moderate to  vigorous intensity activity each week. Consider 40 minutes, 3 to 4 times a week. For best results, activity should last for 40 minutes on average. It is OK to work up to the 40 minute period over time. Examples of moderate-intensity activity is walking one mile in 15 minutes or 30 to 45 minutes of yard work.  Step up your daily activity level. Along with your exercise program, try being more active  throughout the day. Walk instead of drive. Do more household tasks or yard work.  Choose one or more activities you enjoy. Walking is one of the easiest things you can do. You can also try swimming, riding a bike, or taking an exercise class.  Stop exercising and call your doctor if you:    Have chest pain or feel dizzy or lightheaded    Feel burning, tightness, pressure, or heaviness in your chest, neck, shoulders, back, or arms    Have unusual shortness of breath    Have increased joint or muscle pain    Have palpitations or an irregular heartbeat      8466-7923 [employer]. [address] [place] [address]. All rights reserved. This information is not intended as a substitute for professional medical care. Always follow your healthcare professional's instructions.         Patient Education   Urinary Incontinence, Female (Adult)  Urinary incontinence means loss of control of the bladder. This problem affects many women, especially as they get older. If you have incontinence, you may be embarrassed to ask for help. But know that this problem can be treated.  Types of Incontinence  There are different types of incontinence. Two of the main types are described here. You can have more than one type.    Stress incontinence. With this type, urine leaks when pressure (stress) is put on the bladder. This may happen when you cough, sneeze, or laugh. Stress incontinence most often occurs because the pelvic floor muscles that support the bladder and urethra are weak. This can happen after pregnancy and vaginal childbirth or a hysterectomy. It can also be due to excess body weight or hormone changes.    Urge incontinence (also called overactive bladder). With this type, a sudden urge to urinate is felt often. This may happen even though there may not be much urine in the bladder. The need to urinate often during the night is common. Urge incontinence most often occurs because of bladder spasms. This may  be due to bladder irritation or infection. Damage to bladder nerves or pelvic muscles, constipation, and certain medicines can also lead to urge incontinence.  Treatment of urinary incontinence depends on the cause. Further evaluation is needed to find the type you have. This will likely include an exam and certain tests. Based on the results, you and your healthcare provider can then plan treatment. Until a diagnosis is made, the home care tips below can help relieve symptoms.  Home care    Do pelvic floor muscle exercises, if they are prescribed. The pelvic floor muscles help support the bladder and urethra. Many women find that their symptoms improve when doing special exercises that strengthen these muscles. To do the exercises contract the muscles you would use to stop your stream of urine, but do this when youre not urinating. Hold for 10 seconds, then relax. Repeat 10 to 20 times in a row, at least 3 times a day. Your provider may give you other instructions for how to do the exercises and how often.    Keep a bladder diary. This helps track how often and how much you urinate over a set period of time. Bring this diary with you to your next visit with the provider. The information can help your provider learn more about your bladder problem.    Lose weight, if advised to by your provider. Excess weight puts pressure on the bladder. Your provider can help you create a weight-loss plan thats right for you. This may include exercising more and making certain diet changes.    Don't consume foods and drinks that may irritate the bladder. These can include alcohol and caffeinated drinks.    Quit smoking. Smoking and other tobacco use can lead to chronic cough that strains the pelvic floor muscles. Smoking may also damage the bladder and urethra. Talk with your provider about treatments or methods you can use to quit smoking.    If drinking large amounts of fluid causes you to have symptoms, you may be advised to  limit your fluid intake. You may also be advised to drink most of your fluids during the day and to limit fluids at night.    If youre worried about urine leakage or accidents, you may wear absorbent pads to catch urine. Change the pads often. This helps reduce discomfort. It may also reduce the risk of skin or bladder infections.  Follow-up care  Follow up with your healthcare provider, or as directed. It may take some to find the right treatment for your problem. Your treatment plan may include special therapies or medicines. Certain procedures or surgery may also be options. Be sure to discuss any questions you have with your provider.  When to seek medical advice  Call the healthcare provider right away if any of these occur:    Fever of 100.4 F (38 C) or higher, or as directed by your provider    Bladder pain or fullness    Abdominal swelling    Nausea or vomiting    Back pain    Weakness, dizziness or fainting  Date Last Reviewed: 10/1/2017    3849-3171 [employer]. [address] [place] [address]. All rights reserved. This information is not intended as a substitute for professional medical care. Always follow your healthcare professional's instructions.     Patient Education   Preventing Falls in the Home  As you get older, falls are more likely. Thats because your reaction time slows. Your muscles and joints may also get stiffer, making them less flexible. Illness, medications, and vision changes can also affect your balance. A fall could leave you unable to live on your own. To make your home safer, follow these tips:    Floors    Put nonskid pads under area rugs.    Remove throw rugs.    Replace worn floor coverings.    Tack carpets firmly to each step on carpeted stairs. Put nonskid strips on the edges of uncarpeted stairs.    Keep floors and stairs free of clutter and cords.    Arrange furniture so there are clear pathways.    Clean up any spills right  away.    Bathrooms    Install grab bars in the tub or shower.    Apply nonskid strips or put a nonskid rubber mat in the tub or shower.    Sit on a bath chair to bathe.    Use bathmats with nonskid backing.    Lighting    Keep a flashlight in each room.    Put a nightlight along the pathway between the bedroom and the bathroom.    3439-0722 [employer]. [address] [place] [address], [place] [address]. All rights reserved. This information is not intended as a substitute for professional medical care. Always follow your healthcare professional's instructions.           Advance Directive  Patients advance directive was discussed and I am comfortable with the patients wishes.  Patient Education   Personalized Prevention Plan  You are due for the preventive services outlined below.  Your care team is available to assist you in scheduling these services.  If you have already completed any of these items, please share that information with your care team to update in your medical record.  Health Maintenance   Topic Date Due   ? ZOSTER VACCINES (1 of 2) 05/14/1990   ? PNEUMOCOCCAL IMMUNIZATION 65+ LOW/MEDIUM RISK (2 of 2 - PPSV23) 10/14/2017   ? MEDICARE ANNUAL WELLNESS VISIT  09/22/2021   ? FALL RISK ASSESSMENT  09/22/2021   ? DXA SCAN  11/19/2021   ? LIPID  12/14/2023   ? ADVANCE CARE PLANNING  12/14/2023   ? TD 18+ HE  01/01/2024   ? INFLUENZA VACCINE RULE BASED  Completed   ? HEPATITIS B VACCINES  Aged Out

## 2021-06-19 NOTE — PROGRESS NOTES
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 female  Returns to clinic for a follow up   Here with her daughter and no  is needed   Was last seen April - this is a 3 month check up after starting zyprexa    Adherence   States she has been taking it after the last conversation   Is aware of the reasons she takes it and how stress impacts her  No side effects  No TD/EPS noted    Changes in social, personal, professional arena  Is able to work and do the things she wants to do      Improvement   \"more grounded\" as stated by her daughter  Patient also notices an improvement in focus and mood    Patient is asiya for safety  Denies suicidal or homicidal ideation no self-injurious behavior  Is sleeping well and eating well  Denies any increase in medical concerns  Does not report pain    Areas of concern- none      Changes in substance use:   none  Risk Assessment:  No SI/SIB/HI/AH/VH             MEDICATIONS:     Current Outpatient Prescriptions:      ascorbic acid, vitamin C, (VITAMIN C) 100 MG tablet, Take 100 mg by mouth daily., Disp: , Rfl:      b complex vitamins tablet, Take 1 tablet by mouth daily., Disp: , Rfl:      calcium, as carbonate, (TUMS) 200 mg calcium (500 mg) chewable tablet, Chew 1 tablet daily., Disp: , Rfl:      GINSENG ORAL, Take by mouth 3 (three) times a week. , Disp: , Rfl:      multivitamin therapeutic tablet, Take 1 tablet by mouth daily., Disp: , Rfl:      OLANZapine (ZYPREXA) 5 MG tablet, TAKE ONE TO TWO TABLETS BY MOUTH AT BEDTIME, Disp: 60 tablet, Rfl: 2     OLANZapine (ZYPREXA) 5 MG tablet, Take 1 tablet (5 mg total) by mouth at bedtime., Disp: 30 tablet, Rfl: 6     vitamin A 8000 UNIT capsule, Take 8,000 Units by mouth daily., Disp: , Rfl:      vitamin E 200 UNIT capsule, Take 200 Units by mouth " "daily., Disp: , Rfl:       Reviewed risks and benefits of medications yes   Patient able to verbalize understanding of side effects yes   Patient (or legal guardian) verbally agrees to compliance with medication directions  yes             ROS:                  MENTAL STATUS EXAM:   Vitals: Ht 5' 2\" (1.575 m)     Mental Status Examination  Appearance: The patient appears her stated age.  She is appropriately dressed. Alert and awake  Reliability:  She appears to be an adequate historian.    Behavior: She makes good eye contact.  She is cooperative and engaged in the interview.   no evidence of responding to hallucinations or flashbacks.  Speech: Her speech is spontaneous and coherent, with a normal rate, rhythm and tone   Associations: Her associations are connected.    Language:There are no difficulties with expressive or receptive language as observed throughout the interview.    Mood: Described as \"good\"     Affect: congruent and shows a normal range and level of reactivity.    Judgement: Able to make basic decision regarding safety.  Insight: good.    Gait and station:steady.    Thought process: logical   Thought content: no evidence of delusions or paranoia.    Fund of knowledge: Average  Attention / Concentration: Able to remain focused during the interview with minimal distractibility or need for redirection.  Short Term Memory: Intact  Long Term Memory: Intact             LABS:   Personally reviewed.   {No results found for: PHENYTOIN, PHENOBARB, VALPROATE, CBMZ  No results found for this or any previous visit (from the past 72 hour(s)).          DIAGNOSIS:   1. Ongoing education given regarding diagnostic and treatment options with adequate verbalization of understanding.  2. Continue medication management given beneficial response.  Zyprexa 5 mg at bedtime - 6 refills given   3. Crisis planning in place.  4. Continue/refer to Primary Care Provider.      Return to clinic in 6 months sooner as needed; may be " seen in interim timeframe in Nurse-Only Clinic.    .Adwoa Arenas  [facility]  Pager [phone]

## 2021-06-19 NOTE — LETTER
Letter by Mukund Martin MD at      Author: Mukund Martin MD Service: -- Author Type: --    Filed:  Encounter Date: 12/2/2019 Status: Signed         Neisha Marquez [address] [place] [address]     December 2, 2019     Dear Ms. Malcolm,    Below are the results from your recent visit:    Resulted Orders   DXA Bone Density Scan    Narrative    11/19/2019      RE: Neisha Malcolm  YOB: 1940        Dear Mukund Martin,    Patient Profile:  79 y.o. female, postmenopausal, is here for the follow up bone density   test.   History of fractures - None. Family history of osteoporosis - None.    Family history of hip fracture: None. Smoking history - No. Osteoporosis   treatment past -  No. Osteoporosis treatment current - No.  Chronic   medical problems - Celiac sprue (wheat intolerance), Height loss,   Hysterectomy and Oophorectomy. High risk medications -  None.    Assessment:    1. The spine bone density is best assessed using L1 and L4  with T-score   of -2.2.  2. Femoral bone densities show left femoral neck T- score of -2.7, and   right total hip T-score of -2.5.  3.  Bone density was also checked in the wrist as an alternate site since   the spine measurement was influenced by degenerative change.  In the left   33% radius, osteoporosis is seen with T score of -4.6   Since the scan in 2016, bone density has increased a significant 5.6% the   left total hip, and 5.9% in the right total hip.    79 y.o. female with OSTEOPOROSIS and HIGH predicted future fracture risk   with major osteoporotic fracture risk of 20.7 % and hip fracture risk of   7.8 %.    Recommendations:  Further evaluation and therapeutic intervention is advised with a recheck   in 1 year.      Bone densitometry was performed on your patient using our VENNCOMM   densitometer. The results are summarized and a copy of the actual scans   are included for your review. In conformity with the International Society    of Clinical Densitometry's most recent position statement for DXA   interpretation (2015), the diagnosis will be made on the lowest measured   T-score of the lumbar spine, femoral neck, total proximal femur or 33%   radius. Note the change in terminology for diagnostic classification from   OSTEOPENIA to LOW BONE MASS. All trending for sequential exams will be   done using multiple vertebrae or the total proximal femur. Fracture risk   is based on the WHO Fracture Risk Assessment Tool (FRAX). If additional   information is needed or if you would like to discuss the results, please   do not hesitate to call me.       Thank you for referring this patient to [facility].   We are happy to be of service in support of you and your practice. If you   have any questions or suggestions to improve our service, please call me   at [phone].     Sincerely,     Oh Hall M.D. C.C.D.  Osteoporosis Services, [facility]         Your bone density does reveal osteoporosis, much like previously.  Exercise and calcium and vitamin D are important.  I think you should consider taking additional treatment.  Make an appointment to discuss this, or we can address this at our next planned visit.      Please call with questions or contact us using [employer]APE.    Sincerely,        Electronically signed by Mukund Martin MD

## 2021-06-19 NOTE — LETTER
Letter by Adwoa Arenas CNP at      Author: Adwoa Arenas CNP Service: -- Author Type: --    Filed:  Encounter Date: 4/25/2019 Status: (Other)         Neisha Malcolm  [address] [place] [address]                 April 25, 2019       Dear Ms. Malcolm,    We regret to inform you that Anabell Arenas CNP has resigned from her position and as of Friday, May 31 will no longer be providing care in the Outpatient Mental Health & Addiction Care Clinic at [facility].      In the event of a medical or mental health emergency, please call 911 or go to the nearest Emergency Department.  While any emergency department can assist you, [facility] emergency departments are located at [facility] in [place], [facility] in [facility] and [facility] in [place].     We want to assist you in transitioning your care to another provider.      For psychiatric medications, Dr. Amparo Wesley, Senior Medical Director for [facility] Mental Health and Addiction Care, based on clinical review may be able to renew your prescription for an equivalent up to an additional month of medications while you are in the process of connecting with another provider.  Before this option, please contact the [facility] Mental [facility] Addiction Clinic at [phone].      For issues limited to primary care, your primary care provider will need to manage those medications.  If you do not currently have a primary care provider, please contact [facility] at [phone] and they will assist you in scheduling with a primary care provider at one of our fourteen medical clinics in the [facility] area.      [facility] work in partnership with [facility] ([facility]), a large network of community mental health services and providers. In order to provide you with support in locating psychiatry services, [facility] can be asked to contact you to help  locate and schedule an appointment with a community provider.        In the event that you would prefer to connect with a community psychiatry provider on your own we have provided a list of community clinics below.  We have confirmed that all of these clinics are taking new patients.  Please find the attached Release of Information (MICH) form.  To have your records transferred to another care provider, please complete the form and send directly to the following address:    Central Release of Information Department  [address]  [place] [address]    Fax - [phone]        Email - [email or web address]    With any questions regarding the transfer of your records, please contact Release of Information directly at [phone].    Sincerely,       [facility]   Outpatient Mental Health & Addiction Care Clinic  [phone]    JOHN Sanders MD  Clinic Manager  Medical Director  [phone]       [facility].  [address]  [place], [place] [id number] [phone]    [facility]  [facility] Locations  [phone]    [facility]  [facility] Locations  [phone]    [facility]  [address]  [place] [address] [phone]

## 2021-06-19 NOTE — PROGRESS NOTES
Have you seen your PCP: Yes   What medical conditions do we need to know about: None  Are you seeing a therapist: No  Are you taking your medications: yes, no problems  Any concerns about your sleep: none  Any concerns about your Appetite: none  How is your Mood: Good  Any Pain scale 0-10, ten being the worst: 0   Any Anxiety scale 0-5, five being the worst: 1/5   Any Depression scale 0-5, five being the worst: 0/5  Any Suicidal or homicidal ideation: Pt denies both

## 2021-06-19 NOTE — PROGRESS NOTES
Correct pharmacy verified with patient and confirmed in snapshot? [x] yes []no    Charge captured ? [x] yes  [] no    Medications Phoned  to Pharmacy [] yes [x]no  Name of Pharmacist:  List Medications, including dose, quantity and instructions      Medication Prescriptions given to patient   [] yes  [x] no   List the name of the drug the prescription was written for.       Medications ordered this visit were e-scribed.  Verified by order class [x] yes  [] no    Medication changes or discontinuations were communicated to patient's pharmacy: [] yes  [x] no    UA collected [] yes  [x] no    [place] Prescription Monitoring Program Reviewed? [] yes  [x] no    Referrals were made to:      Future appointment was made: [x] yes  [] no    Dictation completed at time of chart check: [x] yes  [] no    I have checked the documentation for today s encounters and the above information has been reviewed and completed.

## 2021-06-20 NOTE — LETTER
Letter by Mukund Martin MD at      Author: Mukund Martin MD Service: -- Author Type: --    Filed:  Encounter Date: 9/23/2020 Status: (Other)         Neisha Malcolm  [address] [place] [address]       September 23, 2020     Dear Ms. Malcolm,    Below are the results from your recent visit:    Resulted Orders   Comprehensive Metabolic Panel   Result Value Ref Range    Sodium 139 136 - 145 mmol/L    Potassium 4.3 3.5 - 5.0 mmol/L    Chloride 104 98 - 107 mmol/L    CO2 29 22 - 31 mmol/L    Anion Gap, Calculation 6 5 - 18 mmol/L    Glucose 100 70 - 125 mg/dL    BUN 11 8 - 28 mg/dL    Creatinine 0.77 0.60 - 1.10 mg/dL    GFR MDRD Af Amer >60 >60 mL/min/1.73m2    GFR MDRD Non Af Amer >60 >60 mL/min/1.73m2    Bilirubin, Total 0.4 0.0 - 1.0 mg/dL    Calcium 9.3 8.5 - 10.5 mg/dL    Protein, Total 6.6 6.0 - 8.0 g/dL    Albumin 3.9 3.5 - 5.0 g/dL    Alkaline Phosphatase 61 45 - 120 U/L    AST 20 0 - 40 U/L    ALT 18 0 - 45 U/L    Narrative    Fasting Glucose reference range is 70-99 mg/dL per  American Diabetes Association (ADA) guidelines.   Lipid Profile   Result Value Ref Range    Triglycerides 95 <=149 mg/dL    Cholesterol 230 (H) <=199 mg/dL    LDL Calculated 155 (H) <=129 mg/dL    HDL Cholesterol 56 >=50 mg/dL    Patient Fasting > 8hrs? Yes    HM1 (CBC with Diff)   Result Value Ref Range    WBC 5.1 4.0 - 11.0 thou/uL    RBC 4.39 3.80 - 5.40 mill/uL    Hemoglobin 13.6 12.0 - 16.0 g/dL    Hematocrit 40.4 35.0 - 47.0 %    MCV 92 80 - 100 fL    MCH 31.0 27.0 - 34.0 pg    MCHC 33.7 32.0 - 36.0 g/dL    RDW 11.5 11.0 - 14.5 %    Platelets 268 140 - 440 thou/uL    MPV 7.6 7.0 - 10.0 fL    Neutrophils % 64 50 - 70 %    Lymphocytes % 28 20 - 40 %    Monocytes % 6 2 - 10 %    Eosinophils % 2 0 - 6 %    Basophils % 1 0 - 2 %    Neutrophils Absolute 3.3 2.0 - 7.7 thou/uL    Lymphocytes Absolute 1.4 0.8 - 4.4 thou/uL    Monocytes Absolute 0.3 0.0 - 0.9 thou/uL    Eosinophils Absolute 0.1 0.0 - 0.4 thou/uL     Basophils Absolute 0.0 0.0 - 0.2 thou/uL       Your tests are overall good.  The LDL cholesterol is the 'bad' cholesterol.  It is only mildly high.  You could take medication to lower it, diet doesn't help much, unless you are very overweight (which you aren't).  I am comfortable not doing anything about it.     Please call with questions or contact us using [facility].    Sincerely,        Electronically signed by Mukund Martin MD
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
## 2021-06-22 NOTE — PROGRESS NOTES
Correct pharmacy verified with patient and confirmed in snapshot? [x] yes []no    Charge captured ? [x] yes  [] no    Medications Phoned  to Pharmacy [] yes [x]no  Name of Pharmacist:  List Medications, including dose, quantity and instructions      Medication Prescriptions given to patient   [] yes  [x] no   List the name of the drug the prescription was written for.       Medications ordered this visit were e-scribed.  Verified by order class [x] yes  [] no   thioridazine 10 mg   Medication changes or discontinuations were communicated to patient's pharmacy: [] yes  [x] no    UA collected [] yes  [x] no    [place] Prescription Monitoring Program Reviewed? [] yes  [x] no    Referrals were made to:  EKG w/muse ordered pt getting done today.     Future appointment was made: [x] yes  [] no  06/06/2019  Dictation completed at time of chart check: [x] yes  [] no    I have checked the documentation for today s encounters and the above information has been reviewed and completed.

## 2021-06-22 NOTE — PROGRESS NOTES
Assessment and Plan:   1 annual wellness visit  2 history of mental health issues including schizophrenia's very stable on Zyprexa  3 chronic respiratory illness and related problems bronchiectasis very stable  4 routine labs today    Problem List Items Addressed This Visit     Mental health disorder    History of schizophrenia      Other Visit Diagnoses     Health care maintenance    -  Primary    Hyperlipidemia, unspecified hyperlipidemia type        Relevant Orders    Thyroid Stimulating Hormone (TSH) (Completed)    Comprehensive Metabolic Panel (Completed)    Lipid Cascade (Completed)    HM2(CBC w/o Differential) (Completed)    Vitamin D deficiency        Relevant Orders    Vitamin D, Total (25-Hydroxy)            The patient's current medical problems were reviewed.    I have had an Advance Directives discussion with the patient.  The following health maintenance schedule was reviewed with the patient and provided in printed form in the after visit summary:   Health Maintenance   Topic Date Due     DXA SCAN  09/08/2018     ZOSTER VACCINES (1 of 2) 01/14/2019 (Originally 5/14/1990)     PNEUMOCOCCAL POLYSACCHARIDE VACCINE AGE 65 AND OVER  01/31/2019 (Originally 5/14/2005)     INFLUENZA VACCINE RULE BASED (1) 01/31/2019 (Originally 8/1/2018)     FALL RISK ASSESSMENT  12/14/2019     ADVANCE DIRECTIVES DISCUSSED WITH PATIENT  10/05/2022     TD 18+ HE  01/01/2024     PNEUMOCOCCAL CONJUGATE VACCINE FOR ADULTS (PCV13 OR PREVNAR)  Completed        Subjective:   Chief Complaint: Neisha Malcolm is an 78 y.o. female here for an Annual Wellness visit.   HPI: Neisha is here with her daughter she is doing remarkably well she moved to [place] from [place] several years ago she has chronic lung problems with intermittent flareups but the last year is been excellent.  Chronic mental health issues with a remote diagnosis of schizophrenia but currently she is very high functioning on Zyprexa she goes to [facility] for  mental health.  She wants her blood tested today.  Annual wellness visit no other issues    Review of Systems:    Please see above.  The rest of the review of systems are negative for all systems.    Patient Care Team:  Mukund Leonard MD as PCP - General (Internal Medicine)     Patient Active Problem List   Diagnosis     Bronchiectasis (H)     History of schizophrenia     Asthenia     Mental health disorder     Past Medical History:   Diagnosis Date     Asthenia      Weight loss       Past Surgical History:   Procedure Laterality Date     APPENDECTOMY       DILATION AND CURETTAGE OF UTERUS       HYSTERECTOMY      unilateral oophorectomy     VAGINAL DELIVERY  1963     x 2; son and daughter; '63 and '66      Family History   Problem Relation Age of Onset     Cancer Mother      Hypertension Mother      Stroke Father       Social History     Socioeconomic History     Marital status: Single     Spouse name: Not on file     Number of children: 2     Years of education: Not on file     Highest education level: Not on file   Social Needs     Financial resource strain: Not on file     Food insecurity - worry: Not on file     Food insecurity - inability: Not on file     Transportation needs - medical: Not on file     Transportation needs - non-medical: Not on file   Occupational History     Occupation: pastoral care/LPN     Comment: retired   Tobacco Use     Smoking status: Never Smoker     Smokeless tobacco: Never Used   Substance and Sexual Activity     Alcohol use: No     Drug use: No     Sexual activity: Not on file   Other Topics Concern     Not on file   Social History Narrative    Moved here from [place] area to be close to daughter ~2016 ;Kristina-she works in financial office at [employer]     .Grew up in [place]) went to [employer] in pastoral care. 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 Chronic bronchiectasis/asthenia/late onset schizophrenia- stable past few decades.    Goes to [employer] [employer]  "Health      Current Outpatient Medications   Medication Sig Dispense Refill     ascorbic acid, vitamin C, (VITAMIN C) 100 MG tablet Take 100 mg by mouth daily.       b complex vitamins tablet Take 1 tablet by mouth daily.       calcium, as carbonate, (TUMS) 200 mg calcium (500 mg) chewable tablet Chew 1 tablet daily.       GINSENG ORAL Take by mouth 3 (three) times a week.        multivitamin therapeutic tablet Take 1 tablet by mouth daily.       OLANZapine (ZYPREXA) 5 MG tablet TAKE ONE TO TWO TABLETS BY MOUTH AT BEDTIME 60 tablet 2     vitamin A 8000 UNIT capsule Take 8,000 Units by mouth daily.       vitamin E 200 UNIT capsule Take 200 Units by mouth daily.       No current facility-administered medications for this visit.       Objective:   Vital Signs:   Visit Vitals  /60 (Patient Site: Left Arm, Patient Position: Sitting, Cuff Size: Adult Regular)   Pulse 72   Ht 5' 3\" (1.6 m)   Wt 130 lb (59 kg)   SpO2 99%   BMI 23.03 kg/m         VisionScreening:  No exam data present     PHYSICAL EXAM  PE:  General-a pleasant woman somewhat eccentric type of affect somewhat asthenic; seems to be more interactive and smiles easier than what I remember.  Her daughter accompanies her to the visit  VS- see above  HEENT- neg   Neck- no adenopathy/thyromegaly/bruits  Chest- clear   Cor- reg no murmurs/gallops/ectopics  Extremities: no edema, good distal pulses  Neuro- Cr. NN-  intact, alert, she moves well not much in the way of psychomotor retardation see annual wellness  health screen  Abdomen- soft non tender, no masses; no organomegaly   -    Assessment Results 12/14/2018   Activities of Daily Living No help needed   Instrumental Activities of Daily Living No help needed   Get Up and Go Score Less than 12 seconds   Mini Cog Total Score 5     A Mini-Cog score of 0-2 suggests the possibility of dementia, score of 3-5 suggests no dementia    Identified Health Risks:     Information on urinary incontinence and treatment " options given to patient.  Patient's advanced directive was discussed and I am comfortable with the patient's wishes.    Recent Results (from the past 240 hour(s))   Thyroid Stimulating Hormone (TSH)   Result Value Ref Range    TSH 1.10 0.30 - 5.00 uIU/mL   Comprehensive Metabolic Panel   Result Value Ref Range    Sodium 140 136 - 145 mmol/L    Potassium 4.7 3.5 - 5.0 mmol/L    Chloride 104 98 - 107 mmol/L    CO2 29 22 - 31 mmol/L    Anion Gap, Calculation 7 5 - 18 mmol/L    Glucose 81 70 - 125 mg/dL    BUN 15 8 - 28 mg/dL    Creatinine 0.73 0.60 - 1.10 mg/dL    GFR MDRD Af Amer >60 >60 mL/min/1.73m2    GFR MDRD Non Af Amer >60 >60 mL/min/1.73m2    Bilirubin, Total 0.4 0.0 - 1.0 mg/dL    Calcium 10.2 8.5 - 10.5 mg/dL    Protein, Total 6.5 6.0 - 8.0 g/dL    Albumin 4.0 3.5 - 5.0 g/dL    Alkaline Phosphatase 59 45 - 120 U/L    AST 19 0 - 40 U/L    ALT 19 0 - 45 U/L   Lipid Cascade   Result Value Ref Range    Cholesterol 219 (H) <=199 mg/dL    Triglycerides 72 <=149 mg/dL    HDL Cholesterol 68 >=50 mg/dL    LDL Calculated 137 (H) <=129 mg/dL    Patient Fasting > 8hrs? No    HM2(CBC w/o Differential)   Result Value Ref Range    WBC 5.2 4.0 - 11.0 thou/uL    RBC 4.42 3.80 - 5.40 mill/uL    Hemoglobin 13.5 12.0 - 16.0 g/dL    Hematocrit 40.2 35.0 - 47.0 %    MCV 91 80 - 100 fL    MCH 30.6 27.0 - 34.0 pg    MCHC 33.6 32.0 - 36.0 g/dL    RDW 11.2 11.0 - 14.5 %    Platelets 237 140 - 440 thou/uL    MPV 7.4 7.0 - 10.0 fL

## 2021-06-23 NOTE — TELEPHONE ENCOUNTER
LVTCB.  Please ask patient if she got a flu shot this season.  If she did please ask if she remembers an approximate date and where she got it from.

## 2021-06-24 NOTE — TELEPHONE ENCOUNTER
Describe your symptoms:  Runny nose, sore throat, swollen glands on the right side, chest congestion  Any pain: yes  New/Ongoing: New  How long have you been having symptoms: 1.5  week(s)  Have you been seen for this:  No.  Appointment offered? Patient would just like an antibiotic  Triage offered?: patient declined  Home remedies tried:  Tylenol cough syrup  Pharmacy Name and Location:  [facility] to leave a detailed message? Yes  [phone]

## 2021-06-24 NOTE — TELEPHONE ENCOUNTER
"Pt calling.  She is requesting \"a second round of abx sent to her pharmacy\".  Pt states her pneumonia Sx have not cleared up since she was prescribed a Z-oliverio over the phone on 2/21/19.  Experiencing tiredness, now coughing up blood.  Pt states her cough is productive.  Denies fever, breathing difficulties or chest pain.    PLAN  Advised Pt she should be assessed in clinic d/t Sx continuing and now coughing up blood.  Discussed home cares per protocol.  Will transfer to scheduling to schedule an appt to be seen in clinic.  Dulce Florence, RN, [facility] RN Triage/Med Refills    Reason for Disposition    Coughing up ely-colored (reddish-brown) or blood-tinged sputum    Protocols used: COUGH-A-OH      "

## 2021-06-24 NOTE — PROGRESS NOTES
ASSESSMENT AND PLAN:    1. Acute bronchitis, unspecified organism  Some improvement with Z- oliverio but feels she needs further antibiotic therapy.  Exam consistent with LRI and bronchitis.  Will treat with cephalexin 250 mg po four times a day for 7 days.  Follow up if fails to improve.     2. Bronchiectasis without complication   Chronic history of this with intermittent hemoptysis.  No overall change in dyspnea or other symptoms.     3. Screening for osteoporosis  She declined DEXA scanning at this time, plans physical exam later this year.     Patient Instructions   1. Take cephalexin 250 mg po four times a day for 7 days  2. Follow up if fails to improve.      CHIEF COMPLAINT:  Chief Complaint   Patient presents with     URI     x 1 month; nasal drainage; productive cough; yellow/bloody phlegm; fatigue; headache     HISTORY OF PRESENT ILLNESS:  Neisha Malcolm is a 78 y.o. female with continued productive cough.  Her intermittent, mild hemoptysis has not changed.  She is not having fever.  She does feel that the Z oliverio helped but she feels she needs more antibiotic.  No dyspnea, or chest pain, or fever, or chills.  No nausea or diarrhea.     REVIEW OF SYSTEMS:   See HPI, all other systems on review are negative.    Past Medical History:   Diagnosis Date     Asthenia      Weight loss      Social History     Tobacco Use   Smoking Status Never Smoker   Smokeless Tobacco Never Used     Family History   Problem Relation Age of Onset     Cancer Mother      Hypertension Mother      Stroke Father      Past Surgical History:   Procedure Laterality Date     APPENDECTOMY       DILATION AND CURETTAGE OF UTERUS       HYSTERECTOMY      unilateral oophorectomy     VAGINAL DELIVERY  1963     x 2; son and daughter; '63 and '66     VITALS:  Vitals:    03/13/19 0914   BP: 112/66   Patient Site: Left Arm   Patient Position: Sitting   Cuff Size: Adult Regular   Pulse: 74   Temp: 97.7  F (36.5  C)   TempSrc: Tympanic   SpO2: 98%  "  Weight: 130 lb 6.4 oz (59.1 kg)   Height: 5' 3\" (1.6 m)     Wt Readings from Last 3 Encounters:   03/13/19 130 lb 6.4 oz (59.1 kg)   12/14/18 130 lb (59 kg)   05/09/18 124 lb 14.4 oz (56.7 kg)     PHYSICAL EXAM:  Constitutional:  In NAD, alert and oriented  Cardiac:  S1 S2 without murmur  Lungs: a few bilateral faint rhonchi,no wheezes or rales  Abdomen:   Soft, flat and non-tender, without guarding, rebound, or mass.      DECISION TO OBTAIN OLD RECORDS AND/OR OBTAIN HISTORY FROM SOMEONE OTHER THAN PATIENT, AND/OR ACCESSING CARE EVERYWHERE):  1  0     REVIEW AND SUMMARIZATION OF OLD RECORDS, AND/OR OBTAINING HISTORY FROM SOMEONE OTHER THAN PATIENT, AND/OR DISCUSSION OF CASE WITH ANOTHER HEALTH CARE PROVIDER:  2 reviewed recent care for Bronchitis and history of bronchiectasis    REVIEW AND/OR ORDER OF OF CLINICAL LAB TESTS: 1  0.    REVIEW AND/OR ORDER OF RADIOLOGY TESTS: 1 0.    REVIEW AND/OR ORDER OF MEDICAL TESTS (EKG/ECHO/COLONOSCOPY/EGD): 1  0    INDEPENDENT  VISUALIZATION OF IMAGE, TRACING, OR SPECIMEN ITSELF (2 EACH):  0     TOTAL: 2    Current Outpatient Medications   Medication Sig Dispense Refill     ascorbic acid, vitamin C, (VITAMIN C) 100 MG tablet Take 100 mg by mouth daily.       b complex vitamins tablet Take 1 tablet by mouth daily.       calcium, as carbonate, (TUMS) 200 mg calcium (500 mg) chewable tablet Chew 1 tablet daily.       cholecalciferol, vitamin D3, (VITAMIN D3 ORAL) Take by mouth.       ferrous sulfate 325 (65 FE) MG tablet Take 1 tablet by mouth daily with breakfast.       GINSENG ORAL Take by mouth 3 (three) times a week.        multivitamin therapeutic tablet Take 1 tablet by mouth daily.       thioridazine (MELLARIL) 10 MG tablet Take 1 tablet (10 mg total) by mouth at bedtime. 30 tablet 3     vitamin A 8000 UNIT capsule Take 8,000 Units by mouth daily.       vitamin E 200 UNIT capsule Take 200 Units by mouth daily.       cephalexin (KEFLEX) 250 MG capsule Take 1 capsule (250 mg " total) by mouth 4 (four) times a day for 7 days. 28 capsule 0     No current facility-administered medications for this visit.      Mukund Martin MD  Internal Medicine  [facility]

## 2021-06-26 NOTE — PROGRESS NOTES
This video/telephone visit will be conducted via a call between you and your physician/provider. We have found that certain health care needs can be provided without the need for an in-person physical exam. This service lets us provide the care you need with a video /telephone conversation. If a prescription is necessary we can send it directly to your pharmacy. If lab work is needed we can place an order for that and you can then stop by a lab to have the test done at a later time.    Just as we bill insurance for in-person visits, we also bill insurance for video/telephone visits. If you have questions about your insurance coverage, we recommend that you speak with your insurance company.    Patient has given verbal consent for video/Telephone visit? yes  Patient would like the video visit invitation sent by: Text to cell phone: NATHANAEL Send to email: [email or web address] or SIP CALL to:  NATHANAEL BLAKE/DAVID : Marcelino HERR LPN    Patient verified allergies, medications and pharmacy via phone.  Patient states she  is ready for visit.

## 2021-06-26 NOTE — PROGRESS NOTES
________________________________________  Medications Phoned  to Pharmacy [] yes [x]no  Name of Pharmacist:  List Medications, including dose, quantity and instructions    Medications ordered this visit were e-scribed.  Verified by order class [x] yes  [] no  Chlorpromazine 25 mg    Medication changes or discontinuations were communicated to patient's pharmacy: [] yes  [x] no    Dictation completed at time of chart check: [] yes  [x] no    I have checked the documentation for today s encounters and the above information has been reviewed and completed.

## 2021-06-26 NOTE — PROGRESS NOTES
"Neisha Malcolm is a 81 y.o. female who is being evaluated via a billable telemedicine visit.    How would you like to obtain your AVS? AVS Preference: Mail a copy.      Medical Decision Making:     Assessment & Plan   Problem List Items Addressed This Visit        Psychiatry Problems    Adjustment disorder with anxiety - Primary     Resolved now that she's moved out of [facility] and retired. Pt stated: \"March to May were the most stressful time in the past 20 years\".             Relevant Medications    chlorproMAZINE (THORAZINE) 25 MG tablet       Other    History of paranoid schizophrenia, currently in remission.     Stable on current medication.  Pt does not want to stop it. No s/e's.  Plan: Continue Thorazine.         High risk medication use     Pt did not come in to clinic today for scheduled DISCUS.  The risks and benefits of medication discussed with the pt and dtr including metabolic s/e's, TD, CV risk.  Pt endorsed occ movements of the mouth.  Says they might be related to a partial denture.  No movements seen on exam.  Daughter has not noticed any.  Plan:    Next visit, DISCUS.   Pt to see dentist.  Offered to do labs- pt declined, will get done at PCP appt.               Other Visit Diagnoses     Paranoid schizophrenia (H)        Relevant Medications    chlorproMAZINE (THORAZINE) 25 MG tablet           Return in about 3 months (around 9/15/2021), or in person.    Meaghan Welsh MD  [facility] MENTAL HEALTH & ADDI[facility]        History of Present Illness:   Neihsa Malcolm is a 81 y.o. female here with history of schizophrenia, and paranoia, here for follow-up visit.  Daughter Shante with her. Has been stable on Thorazine. Pt reports she is doing well, mood is stable, sleep/appetite good, denies hallucinations.    She moved back to her apt. Activities: Attends [employer] and walks daily.  Plays the piano, paints.  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        ROS:  Denies SI/manic/psychotic sx.  See HPI, otherwise negative.      Patient's problem list, medication list, allergies, past medical history, surgical history, family history and social history were reviewed.             Current Outpatient Medications:      ascorbic acid, vitamin C, (VITAMIN C) 100 MG tablet, Take 100 mg by mouth daily., Disp: , Rfl:      b complex vitamins tablet, Take 1 tablet by mouth daily., Disp: , Rfl:      CALCIUM ORAL, Take 1 tablet by mouth daily., Disp: , Rfl:      chlorproMAZINE (THORAZINE) 25 MG tablet, TAKE 1 TABLET BY MOUTH EVERYDAY AT BEDTIME, Disp: 30 tablet, Rfl: 4     cholecalciferol, vitamin D3, (VITAMIN D3 ORAL), Take by mouth., Disp: , Rfl:      multivitamin therapeutic tablet, Take 1 tablet by mouth daily., Disp: , Rfl:      vitamin A 8000 UNIT capsule, Take 8,000 Units by mouth daily., Disp: , Rfl:     See epic      MSE:      Vital signs: not currently breastfeeding.    Alert & oriented x 3.   Appearance: Neat  Speech: Normal rate, rhythm and tone.  Gait: WNL  Musculoskeletal: No abnormal movements  Mood/Affect: Neutral.  Thought Process: Normal rate, logical.  Thought Content: No suicide or homicide ideation.  Associations: Logical  Perceptions: Denies hallucinations.  Memory: recent and remote memory intact, not formally tested  Attention span and concentration: normal, not formally tested  Language: Intact.  Fund of Knowledge: Normal.  Insight and Judgement: Adequate         Telemedicine Visit Details  Type of service: Video visit    Originating Location (pt. Location): Home    Distant Location (provider location):  [facility] MENTAL HEALTH & [facility]     Platform used for Video Visit: Other: phone     This visit started at: 10 AM and concluded at: 10:40 AM     Total time spent on this  encounter, on the date of service, including pre-visit review of separately obtained history, face-to-face interaction performing medically appropriate physical exam, patient counseling/education, interpretation of diagnostic results, care coordination and documentation was 40 minutes.            Meaghan Welsh MD

## 2021-06-29 NOTE — PROGRESS NOTES
Progress Notes by Marifer Funes LPN at 10/6/2020  9:30 AM     Author: Marifer Funes LPN Service: -- Author Type: Licensed Nurse    Filed: 10/6/2020  4:57 PM Encounter Date: 10/6/2020 Status: Signed    : Marifer Funes LPN (Licensed Nurse)       This video/telephone visit will be conducted via a call between you and your physician/provider. We have found that certain health care needs can be provided without the need for an in-person physical exam. This service lets us provide the care you need with a video /telephone conversation. If a prescription is necessary we can send it directly to your pharmacy. If lab work is needed we can place an order for that and you can then stop by our lab to have the test done at a later time.   Just as we bill insurance for in-person visits, we also bill insurance for video/telephone visits. If you have questions about your insurance coverage, we recommend that you speak with your insurance company.   Patient has given verbal consent for video/Telephone visit? yes  Patient would like the video visit invitation sent by: Text to cell phone: NA or Send to email: NA  Patient would like telephone visit, please call: [phone]  HAYDEN/DAVID : Marcelino HERR LPN    Patient verified allergies, medications and pharmacy via phone.  Patient states she is ready for visit.

## 2021-06-30 NOTE — PROGRESS NOTES
Progress Notes by Anne Tilley CMA at 4/6/2021  9:30 AM     Author: Anne Tilley CMA Service: -- Author Type: Certified Medical Assistant    Filed: 4/6/2021  4:45 PM Encounter Date: 4/6/2021 Status: Signed    : Anne Tilley CMA (Certified Medical Assistant)       This video/telephone visit will be conducted via a call between you and your physician/provider. We have found that certain health care needs can be provided without the need for an in-person physical exam. This service lets us provide the care you need with a video /telephone conversation. If a prescription is necessary we can send it directly to your pharmacy. If lab work is needed we can place an order for that and you can then stop by our lab to have the test done at a later time.   Just as we bill insurance for in-person visits, we also bill insurance for video/telephone visits. If you have questions about your insurance coverage, we recommend that you speak with your insurance company.   Patient has given verbal consent for Telephone visit? Yes   Patient would like telephone visit please call: [phone]  HAYDEN/DAVID VITAL CMA   Pt had forgotten about today's appointment, since its over the telephone ok to go ahead.   No refills needed today.    Patient verified allergies, medications and pharmacy via phone. Patient states she is ready for visit.  MN  was reviewed prior to seeing patient today. See below for embedded report.

## 2021-06-30 NOTE — PROGRESS NOTES
Progress Notes by Anne Tilley CMA at 1/5/2021  9:30 AM     Author: Anne Tilley CMA Service: -- Author Type: Certified Medical Assistant    Filed: 1/5/2021  4:46 PM Encounter Date: 1/5/2021 Status: Signed    : Anne Tilley CMA (Certified Medical Assistant)       This video/telephone visit will be conducted via a call between you and your physician/provider. We have found that certain health care needs can be provided without the need for an in-person physical exam. This service lets us provide the care you need with a video /telephone conversation. If a prescription is necessary we can send it directly to your pharmacy. If lab work is needed we can place an order for that and you can then stop by our lab to have the test done at a later time.   Just as we bill insurance for in-person visits, we also bill insurance for video/telephone visits. If you have questions about your insurance coverage, we recommend that you speak with your insurance company.   Patient has given verbal consent for video/Telephone visit? Yes  Patient would like telephone visit, please call: [phone]  HAYDEN/DAVID VITAL CMA       Patient verified allergies, medications and pharmacy via phone.  Patient states she is ready for visit.  MN  was reviewed prior to seeing patient today. See below for embedded report.

## 2021-07-03 NOTE — ADDENDUM NOTE
Addendum Note by Rafael Leonard MD at 11/21/2017 10:09 AM     Author: Rafael Leonard MD Service: -- Author Type: Physician    Filed: 11/21/2017 10:09 AM Encounter Date: 11/20/2017 Status: Signed    : Rafael Leonard MD (Physician)    Addended by: RAFAEL LEONARD on: 11/21/2017 10:09 AM        Modules accepted: Orders

## 2021-07-03 NOTE — ADDENDUM NOTE
Addendum Note by Lori Chaudhary LPN at 11/21/2017  9:30 AM     Author: Lori Chaudhary LPN Service: -- Author Type: Licensed Nurse    Filed: 11/21/2017  9:30 AM Encounter Date: 11/20/2017 Status: Signed    : Lori Chaudhary LPN (Licensed Nurse)    Addended by: LORI CHAUDHARY on: 11/21/2017 09:30 AM        Modules accepted: Orders

## 2021-07-03 NOTE — ADDENDUM NOTE
Addendum Note by Rafael Leonard MD at 4/8/2017  8:28 AM     Author: Rafael Leonard MD Service: -- Author Type: Physician    Filed: 4/8/2017  8:28 AM Encounter Date: 4/6/2017 Status: Signed    : Rafael Leonard MD (Physician)    Addended by: RAFAEL LEONARD on: 4/8/2017 08:28 AM        Modules accepted: Orders

## 2021-07-03 NOTE — ADDENDUM NOTE
Addendum Note by Jodie Huizar MD at 1/31/2018  2:03 PM     Author: Jodie Huizar MD Service: -- Author Type: Physician    Filed: 1/31/2018  2:03 PM Encounter Date: 1/29/2018 Status: Signed    : Jodie Huizar MD (Physician)    Addended by: JODIE HUIZAR on: 1/31/2018 02:03 PM        Modules accepted: Orders

## 2021-07-03 NOTE — ADDENDUM NOTE
Addendum Note by Babs Trujillo LPN at 1/31/2018  2:01 PM     Author: Babs Trujillo LPN Service: -- Author Type: Licensed Nurse    Filed: 1/31/2018  2:01 PM Encounter Date: 1/29/2018 Status: Signed    : Babs Trujillo LPN (Licensed Nurse)    Addended by: BABS TRUJILLO on: 1/31/2018 02:01 PM        Modules accepted: Orders

## 2021-09-14 ENCOUNTER — OFFICE VISIT (OUTPATIENT)
Dept: BEHAVIORAL HEALTH | Facility: CLINIC | Age: 81
End: 2021-09-14
Payer: MEDICARE

## 2021-09-14 VITALS
DIASTOLIC BLOOD PRESSURE: 73 MMHG | HEART RATE: 90 BPM | BODY MASS INDEX: 22.86 KG/M2 | WEIGHT: 129 LBS | SYSTOLIC BLOOD PRESSURE: 121 MMHG | HEIGHT: 63 IN

## 2021-09-14 DIAGNOSIS — Z79.899 HIGH RISK MEDICATION USE: ICD-10-CM

## 2021-09-14 DIAGNOSIS — F20.0 PARANOID SCHIZOPHRENIA (H): Primary | ICD-10-CM

## 2021-09-14 PROCEDURE — 99215 OFFICE O/P EST HI 40 MIN: CPT | Performed by: PSYCHIATRY & NEUROLOGY

## 2021-09-14 PROCEDURE — G0463 HOSPITAL OUTPT CLINIC VISIT: HCPCS

## 2021-09-14 RX ORDER — CHLORPROMAZINE HYDROCHLORIDE 25 MG/1
25 TABLET, FILM COATED ORAL AT BEDTIME
COMMUNITY
Start: 2021-06-15 | End: 2021-09-14

## 2021-09-14 RX ORDER — CALCIUM GLUCONATE 94 MG/ML
1 INJECTION, SOLUTION INTRAVENOUS
COMMUNITY

## 2021-09-14 RX ORDER — IPRATROPIUM BROMIDE 17 UG/1
AEROSOL, METERED RESPIRATORY (INHALATION)
COMMUNITY
Start: 2021-05-28 | End: 2022-09-13

## 2021-09-14 RX ORDER — MULTIVITAMIN,THERAPEUTIC
1 TABLET ORAL
COMMUNITY

## 2021-09-14 RX ORDER — CHLORPROMAZINE HYDROCHLORIDE 25 MG/1
25 TABLET, FILM COATED ORAL AT BEDTIME
Qty: 12 TABLET | Refills: 0 | Status: SHIPPED | OUTPATIENT
Start: 2021-09-14 | End: 2022-02-01

## 2021-09-14 RX ORDER — CHLORPROMAZINE HYDROCHLORIDE 25 MG/1
25 TABLET, FILM COATED ORAL AT BEDTIME
Qty: 90 TABLET | Refills: 1 | Status: SHIPPED | OUTPATIENT
Start: 2021-09-14 | End: 2021-09-14

## 2021-09-14 RX ORDER — VITAMIN B COMPLEX
1 TABLET ORAL 2 TIMES DAILY
COMMUNITY

## 2021-09-14 ASSESSMENT — MIFFLIN-ST. JEOR: SCORE: 1019.27

## 2021-09-14 NOTE — PROGRESS NOTES
Pt is here in office for follow up today.  Pt's daughter Kristina is present today and per Pt she is her memory.       Pt should have 1 refill remaining at pharmacy for October.    Discus performed today Score: 0  ; due again 3/2022        MN  to be reviewed by provider.

## 2021-09-14 NOTE — PROGRESS NOTES
"    Medical Decision Makin. Paranoid schizophrenia (H)  Stable on current medication.  Denies side effects.  No psychotic symptoms.  Plan:   -Continue chlorproMAZINE (THORAZINE) 25 mg q hs.  -f/u 6 mos or prn    2. High risk medication use  The risks and benefits of medication discussed with the pt and daughter including metabolic s/e's, TD, CV risk.  Metabolic conditions treated by PCP.  Plan:   Today's DISCUS score = 0       3. Cognitive assessment  Pt and dtr deny memory/cognitive problems. Pt no longer drives (walks 10 blocks to [place] each day).  Plan:   Today's MOCA score = 28/30 (lost 2 points for delayed recall).  Pt plans to update Healthcare Directive with PCP at [facility]. Does not want \"extreme measures\".         History of Present Illness:   Neisha Malcolm is a 81 year old female with history of paranoid schizophrenia here for follow-up visit.  Daughter Shante is with her.  Pt reports she is doing well, mood is stable, sleep/appetite good, denies hallucinations.    Lives alone in her apt. Children take her grocery shopping.  Activities: Attends [employer] and walks daily, paints.  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  Daughter Shante works at [employer], (does insurance).      ROS: Denies SI/manic/psychotic sx. See HPI, otherwise negative.    Patient's problem list, medication list, allergies, past medical history, surgical history, family history and social history were reviewed.       Current Outpatient Medications:      B Complex Vitamins (VITAMIN B-COMPLEX) TABS, Take 1 tablet by mouth, Disp: , Rfl:      calcium gluconate injectable solution used ORALLY, Take 1 tablet by mouth, Disp: , Rfl:      chlorproMAZINE (THORAZINE) 25 MG tablet, Take 1 tablet (25 mg) by mouth At Bedtime, Disp: 12 tablet, Rfl: 0     Cholecalciferol " "(VITAMIN D3) 25 MCG (1000 UT) CAPS, Take by mouth 2 times daily, Disp: , Rfl:      ipratropium (ATROVENT HFA) 17 MCG/ACT inhaler, 1-2 puff tid as needed for cough or phlegm, Disp: , Rfl:      Multiple Vitamins-Minerals (ONCOVITE) TABS, Take 1 tablet by mouth, Disp: , Rfl:      vitamin C (ASCORBIC ACID) 100 MG tablet, Take 100 mg by mouth, Disp: , Rfl:     See [facility]  Recent Labs   Lab Test 09/22/20  1040 12/14/18  1020   CHOL 230* 219*   HDL 56 68   * 137*   TRIG 95 72        TSH   Date Value Ref Range Status   12/14/2018 1.10 0.30 - 5.00 uIU/mL Final        MSE:      Vital signs:   /73 (BP Location: Left arm, Patient Position: Sitting, Cuff Size: Adult Regular)   Pulse 90   Ht 1.6 m (5' 3\")   Wt 58.5 kg (129 lb)   BMI 22.85 kg/m      Alert & oriented x 3.   Appearance: Appears stated age, casually dressed.  Speech: Normal rate, rhythm and tone.  Gait: Normal.  Musculoskeletal: Normal strength, no abnormal movements.  Mood/Affect: Neutral.  Thought Process: Normal rate, logical.  Thought Content: No suicide or homicide ideation.  Associations: Intact, no delusions.  Perceptions: No hallucinations.  Memory: recent and remote memory intact.  Attention span and concentration: normal.  Language: Intact.  Fund of Knowledge: Normal.  Insight and Judgement: Adequate.    Today's MOCA score = 28/30 (lost 2 points for delayed recall).          Meaghan Welsh MD        Total time spent on this encounter, on the date of service, including pre-visit review of separately obtained history, face-to-face interaction performing medically appropriate physical exam, patient counseling/education, interpretation of diagnostic results, care coordination and documentation was 45 minutes.     "

## 2021-09-15 NOTE — TELEPHONE ENCOUNTER
Pt called back to pass along that the pharmacy was able to refill medication. No need to follow up on refill.    Include Location In Plan?: No Detail Level: Zone

## 2021-09-16 NOTE — PATIENT INSTRUCTIONS
Continue medications as prescribed  Have your pharmacy contact us for a refill if you are running low on medications (We may ask you to come into clinic to get a refill from the nurse  No Alcohol or drug use  No driving if sedated  Call the clinic with any questions or concerns   Reach out for help if you feel like hurting yourself or others ([facility] [phone]: [address], [phone] or [facility] Suicide Hotline [phone] , call 911 or go to nearest Emergency room    Follow up as directed, for your appointments, per your After Visit Summary Form.

## 2021-09-16 NOTE — PROGRESS NOTES
Correct pharmacy verified with patient and confirmed in snapshot? [x] yes []no    Charge captured ? [x] yes  [] no    Medications Phoned  to Pharmacy [] yes [x]no  Name of Pharmacist:  List Medications, including dose, quantity and instructions      Medication Prescriptions given to patient   [] yes  [x] no   List the name of the drug the prescription was written for.       Medications ordered this visit were e-scribed.  Verified by order class [x] yes  [] no  Thorazine 25 mg   Medication changes or discontinuations were communicated to patient's pharmacy: [] yes  [x] no    UA collected [] yes  [x] no    [place] Prescription Monitoring Program Reviewed? [] yes  [x] no    Referrals were made to:  NA    Future appointment was made: [x] yes  [] no   03/15/2022  Dictation completed at time of chart check: [x] yes  [] no    I have checked the documentation for today s encounters and the above information has been reviewed and completed.

## 2021-09-16 NOTE — PROGRESS NOTES
________________________________________  Medications Phoned  to Pharmacy [] yes [x]no  Name of Pharmacist:  List Medications, including dose, quantity and instructions    Medications ordered this visit were e-scribed.  Verified by order class [x] yes  [] no  Thorazine  Medication changes or discontinuations were communicated to patient's pharmacy: [] yes  [x] no    Dictation completed at time of chart check: [] yes  [x] no    I have checked the documentation for today s encounters and the above information has been reviewed and completed.       General

## 2022-03-15 ENCOUNTER — OFFICE VISIT (OUTPATIENT)
Dept: BEHAVIORAL HEALTH | Facility: CLINIC | Age: 82
End: 2022-03-15
Payer: MEDICARE

## 2022-03-15 VITALS — BODY MASS INDEX: 23.03 KG/M2 | WEIGHT: 130 LBS

## 2022-03-15 DIAGNOSIS — F20.0 PARANOID SCHIZOPHRENIA (H): ICD-10-CM

## 2022-03-15 PROCEDURE — 99215 OFFICE O/P EST HI 40 MIN: CPT | Performed by: PSYCHIATRY & NEUROLOGY

## 2022-03-15 PROCEDURE — G0463 HOSPITAL OUTPT CLINIC VISIT: HCPCS

## 2022-03-15 RX ORDER — CHLORPROMAZINE HYDROCHLORIDE 25 MG/1
25 TABLET, FILM COATED ORAL AT BEDTIME
Qty: 90 TABLET | Refills: 1 | Status: SHIPPED | OUTPATIENT
Start: 2022-03-15 | End: 2022-09-13

## 2022-03-15 ASSESSMENT — PAIN SCALES - GENERAL: PAINLEVEL: NO PAIN (0)

## 2022-03-15 NOTE — PROGRESS NOTES
"    Medical Decision Makin. Paranoid schizophrenia (H)  Stable on current medication.  Denies side effects.  No hallucinations.  Pt endorses Jewish based belief that she should not treat cataract or glaucoma. Daughter is concerned because pt refuses treatment.  Both conditions are advancing and she is at risk to lose her vision. She reports \"Not seeing some signs when I drive.\"   Pt is able to articulate understanding of these conditions and that she is at risk for losing eyesight.  Her reason for not accepting treatment is: \"In Dec 2020, I heard the Lord say do not accept treatment for your eyes.  I am going to do things that will make you famous, you have to be humble.\"    Plan: Had discussion with pt and daughter.  Advised pt to not drive. Recommend vision check/drivers evaluation if pt continues to drive. Pt enc to consider that her decisions affect her family.     -Continue chlorproMAZINE (THORAZINE) 25 mg q hs.  -f/u 6 mos or prn      2. High risk medication use  The risks and benefits of medication discussed with the pt and daughter including metabolic s/e's, TD, CV risk.  Metabolic conditions treated by PCP.  Plan:   Today's DISCUS score = 0   Neuroleptic consent signed  [facility]:  -CBC  -Lipid panel  -CMP (comprehensive metabolic panel)  -TSH      3. Cognitive assessment  Pt and dtr deny memory/cognitive problems.    MOCA score = 28/30 (21).         History of Present Illness:   Neisha Malcolm is a 81 year old female with history of paranoid schizophrenia here for follow-up visit.  Daughter Shante is with her.  Pt reports she is doing well, mood is stable, sleep/appetite good, denies hallucinations.    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    Activities: Enjoys painting, piano, reading, spending time with friends and family.    Social:   Retired from [employer] 2021. 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  Daughter Shante works at [employer], (does insurance).      ROS: Denies SI/manic/psychotic sx. See HPI, otherwise negative.    Patient's problem list, medication list, allergies, past medical history, surgical history, family history and social history were reviewed.       Current Outpatient Medications:      B Complex Vitamins (VITAMIN B-COMPLEX) TABS, Take 1 tablet by mouth 2 times daily , Disp: , Rfl:      calcium gluconate injectable solution used ORALLY, Take 1 tablet by mouth, Disp: , Rfl:      chlorproMAZINE (THORAZINE) 25 MG tablet, Take 1 tablet (25 mg) by mouth At Bedtime, Disp: 90 tablet, Rfl: 1     Cholecalciferol (VITAMIN D3) 25 MCG (1000 UT) CAPS, Take by mouth 2 times daily, Disp: , Rfl:      ipratropium (ATROVENT HFA) 17 MCG/ACT inhaler, 1-2 puff tid as needed for cough or phlegm, Disp: , Rfl:      ipratropium (ATROVENT) 0.02 % neb solution, Inhale 0.5 mg into the lungs, Disp: , Rfl:      Multiple Vitamins-Minerals (ONCOVITE) TABS, Take 1 tablet by mouth, Disp: , Rfl:      vitamin C (ASCORBIC ACID) 100 MG tablet, Take 100 mg by mouth, Disp: , Rfl:     See [facility]  Recent Labs   Lab Test 09/22/20  1040 12/14/18  1020   CHOL 230* 219*   HDL 56 68   * 137*   TRIG 95 72        TSH   Date Value Ref Range Status   12/14/2018 1.10 0.30 - 5.00 uIU/mL Final        MSE:      Vital signs:   Wt 59 kg (130 lb)   BMI 23.03 kg/m      Alert & oriented x 3.   Appearance: Appears stated age, casually dressed.  Speech: Normal rate, rhythm and tone.  Gait: Normal.  Musculoskeletal: Normal strength, no abnormal movements.  Mood/Affect: Neutral.  Thought Process: Normal rate, logical.  Thought Content: No suicide or homicide ideation.   Associations: Intact, Restorationism based belief vs. delusion that she should not treat glaucoma or cataract.  Perceptions: No hallucinations.  Memory: recent and remote memory intact, not formally tested  Attention span and concentration:  normal, not formally tested  Language: Intact.  Fund of Knowledge: Normal.  Insight and Judgement: Sha Welsh MD        Total time spent on this encounter, on the date of service, including pre-visit review of separately obtained history, face-to-face interaction performing medically appropriate physical exam, patient counseling/education, interpretation of diagnostic results, care coordination and documentation was 50 minutes.

## 2022-03-15 NOTE — PROGRESS NOTES
Pt is here for routine med check and accompanied by her daughter.     [facility] Post-Appointment Cart -check      Correct pharmacy verified with patient and updated in chart? [x] yes []no    Charge captured ? [x] yes  [] no [] n/a-virtual     Medications ordered this visit were e-scribed.  Verified by order class [] yes  [x] no    List Medications:    Medication changes or discontinuations were communicated to patient's pharmacy: [] yes  [x] no    UA collected [] yes  [x] no  [] n/a-virtual     Outside referrals / labs, etc support staff to follow up: [] yes  [x] no    Future appointment was made: [x] yes  [] no  [] n/a    Dictation completed at time of chart check: [x] yes  [] no    I have checked the documentation for today s encounters and the above information has been reviewed and completed.      Marifer Funes on March 18, 2022 at 2:56 PM

## 2022-03-15 NOTE — PATIENT INSTRUCTIONS
Get labs done at [facility]:  -CBC  -Lipid panel  -CMP (comprehensive metabolic panel)  -TSH    Recommend vision check/drivers evaluation if you continue to drive.

## 2022-09-13 ENCOUNTER — OFFICE VISIT (OUTPATIENT)
Dept: PSYCHIATRY | Facility: CLINIC | Age: 82
End: 2022-09-13
Payer: MEDICARE

## 2022-09-13 VITALS
SYSTOLIC BLOOD PRESSURE: 134 MMHG | WEIGHT: 126 LBS | BODY MASS INDEX: 22.32 KG/M2 | DIASTOLIC BLOOD PRESSURE: 60 MMHG | HEART RATE: 92 BPM

## 2022-09-13 DIAGNOSIS — F20.0 PARANOID SCHIZOPHRENIA (H): ICD-10-CM

## 2022-09-13 PROCEDURE — 99214 OFFICE O/P EST MOD 30 MIN: CPT | Performed by: PSYCHIATRY & NEUROLOGY

## 2022-09-13 RX ORDER — CHLORPROMAZINE HYDROCHLORIDE 25 MG/1
12.5 TABLET, FILM COATED ORAL AT BEDTIME
Qty: 45 TABLET | Refills: 4 | Status: SHIPPED | OUTPATIENT
Start: 2022-09-13 | End: 2023-03-14

## 2022-09-13 RX ORDER — FLUTICASONE PROPIONATE AND SALMETEROL 250; 50 UG/1; UG/1
1 POWDER RESPIRATORY (INHALATION)
COMMUNITY
Start: 2022-08-05

## 2022-09-13 ASSESSMENT — ANXIETY QUESTIONNAIRES
GAD7 TOTAL SCORE: 0
GAD7 TOTAL SCORE: 0
3. WORRYING TOO MUCH ABOUT DIFFERENT THINGS: NOT AT ALL
2. NOT BEING ABLE TO STOP OR CONTROL WORRYING: NOT AT ALL
6. BECOMING EASILY ANNOYED OR IRRITABLE: NOT AT ALL
4. TROUBLE RELAXING: NOT AT ALL
1. FEELING NERVOUS, ANXIOUS, OR ON EDGE: NOT AT ALL
5. BEING SO RESTLESS THAT IT IS HARD TO SIT STILL: NOT AT ALL
7. FEELING AFRAID AS IF SOMETHING AWFUL MIGHT HAPPEN: NOT AT ALL

## 2022-09-13 ASSESSMENT — PATIENT HEALTH QUESTIONNAIRE - PHQ9: SUM OF ALL RESPONSES TO PHQ QUESTIONS 1-9: 0

## 2022-09-13 NOTE — PROGRESS NOTES
[facility] Post-Appointment Chart -check      Medications ordered this visit were e-scribed.  Verified by order class [x] yes  [] no    List Medications: Chlopromazine 25mg  Medication changes or discontinuations were communicated to patient's pharmacy: [] yes  [x] no    UA collected [] yes  [x] no  [] n/a-virtual     Outside referrals / labs, etc support staff to follow up: [] yes  [x] no    Future appointment was made: [] yes  [x] no  [] n/a  Future Appointments 9/13/2022 - 3/12/2023            None          Dictation completed at time of chart check: [] yes  [x] no    I have checked the documentation for today s encounters and the above information has been reviewed and completed.      Berenice Mendez CMA on September 13, 2022 at 1:39 PM             Patient in clinic for Follow up, states no new concerns    Discus needed? Yes- today's score 0   UA Needed? No   MN  to be reviewed by provider.   Medication refill is pended for review and approval by provider.    RADHA-7 scores:    RADHA-7 SCORE 9/13/2022   Total Score 0       PHQ-9 scores:   PHQ-9 SCORE 9/13/2022   PHQ-9 Total Score 0       Berenice Mendez CMA on 9/13/2022 at 9:01 AM

## 2022-09-13 NOTE — PROGRESS NOTES
"    Medical Decision Makin. Paranoid schizophrenia (H)  Pt cut Thorazine dose in half in early May (to 12.5mg).  Reports she is stable, denies delusions/psychotic symptoms.  Daughter confirms this.  Sleep good.    Plan:   Had discussion with pt and daughter regarding pt's desire to self publish and promote her children's book \"[employer]\". Pt agreed to tell her daughter before making financial/travel decisions.  They will contact me if there is any decompensation so we can increase dose back to 25 mg daily.    - chlorproMAZINE (THORAZINE) 12.5 mg q hs.  -f/u 6 mos or prn      2. High risk medication use  The risks and benefits of medication discussed with the pt and daughter including metabolic s/e's, TD, CV risk. Metabolic conditions treated by PCP.  Plan:   Today's DISCUS score = 0   Labs reviewed:  -CBC, CMP, TSH- WNL (22)  -Elevated cholesterol (3/17/22)      3. Cognitive assessment  Pt and dtr deny memory/cognitive problems.    MOCA score = 28/30 (21).    4.  Lower leg cramps  Started after she had COVID March-2022.   Plan: Had discussion about possible etiologies including EPSE from Thorazine- although unlikely since she did not experience these at 25 mg dose and they did not start until after COVID.  Pt to f/u with PCP.     History of Present Illness:   Neisha Malcolm is a 82 year old female with history of paranoid schizophrenia here for follow-up visit.  Daughter Shante is with her.  Pt reports she is doing well, mood is stable, sleep/appetite good, denies hallucinations.    Lives at [facility] (Senior living).    Activities: Walks to [employer] daily, does her own food shopping, takes public transportation.  Enjoys painting, piano, reading, spending time with friends and family.    Social:   Retired from [employer] 2021. 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  Daughter Shante works at [employer], (does insurance).      ROS: " Denies SI/manic/psychotic sx. See HPI, otherwise negative.    Patient's problem list, medication list, allergies, past medical history, surgical history, family history and social history were reviewed.       Current Outpatient Medications:      B Complex Vitamins (VITAMIN B-COMPLEX) TABS, Take 1 tablet by mouth 2 times daily , Disp: , Rfl:      calcium gluconate injectable solution used ORALLY, Take 1 tablet by mouth, Disp: , Rfl:      chlorproMAZINE (THORAZINE) 25 MG tablet, Take 0.5 tablets (12.5 mg) by mouth At Bedtime, Disp: 45 tablet, Rfl: 4     Cholecalciferol (VITAMIN D3) 25 MCG (1000 UT) CAPS, Take by mouth 2 times daily, Disp: , Rfl:      fluticasone-salmeterol (ADVAIR) 250-50 MCG/ACT inhaler, Inhale 1 puff into the lungs, Disp: , Rfl:      ipratropium (ATROVENT) 0.02 % neb solution, Inhale 0.5 mg into the lungs, Disp: , Rfl:      Multiple Vitamins-Minerals (ONCOVITE) TABS, Take 1 tablet by mouth, Disp: , Rfl:     See [facility]  Recent Labs   Lab Test 09/22/20  1040 12/14/18  1020   CHOL 230* 219*   HDL 56 68   * 137*   TRIG 95 72        TSH   Date Value Ref Range Status   12/14/2018 1.10 0.30 - 5.00 uIU/mL Final        MSE:      Vital signs:   /60 (BP Location: Right arm, Patient Position: Sitting, Cuff Size: Adult Regular)   Pulse 92   Wt 57.2 kg (126 lb)   BMI 22.32 kg/m      Alert & oriented x 3.   Appearance: Appears stated age, casually dressed.  Speech: Normal rate, rhythm and tone.  Gait: Normal.  Musculoskeletal: Normal strength, no abnormal movements.  Mood/Affect: Neutral.  Thought Process: Normal rate, logical.  Thought Content: No suicide or homicide ideation.   Associations: Intact, Amish based belief vs. delusion that she should not treat glaucoma or cataract.  Perceptions: No hallucinations.  Memory: recent and remote memory intact, not formally tested  Attention span and concentration: normal, not formally tested  Language: Intact.  Fund of Knowledge: Normal.  Insight and  Judgement: Sha Welsh MD        Total time spent on this encounter, on the date of service, including pre-visit review of separately obtained history, face-to-face interaction performing medically appropriate physical exam, patient counseling/education, interpretation of diagnostic results, care coordination and documentation was 50 minutes.

## 2023-03-14 ENCOUNTER — OFFICE VISIT (OUTPATIENT)
Dept: PSYCHIATRY | Facility: CLINIC | Age: 83
End: 2023-03-14
Payer: MEDICARE

## 2023-03-14 VITALS
BODY MASS INDEX: 23.03 KG/M2 | SYSTOLIC BLOOD PRESSURE: 147 MMHG | DIASTOLIC BLOOD PRESSURE: 77 MMHG | WEIGHT: 130 LBS | HEART RATE: 83 BPM

## 2023-03-14 DIAGNOSIS — F20.0 PARANOID SCHIZOPHRENIA (H): ICD-10-CM

## 2023-03-14 PROCEDURE — 99215 OFFICE O/P EST HI 40 MIN: CPT | Performed by: PSYCHIATRY & NEUROLOGY

## 2023-03-14 RX ORDER — CHLORPROMAZINE HYDROCHLORIDE 25 MG/1
25 TABLET, FILM COATED ORAL AT BEDTIME
Qty: 90 TABLET | Refills: 1 | Status: SHIPPED | OUTPATIENT
Start: 2023-03-14

## 2023-03-14 ASSESSMENT — PAIN SCALES - GENERAL: PAINLEVEL: NO PAIN (0)

## 2023-03-14 NOTE — PROGRESS NOTES
"    Medical Decision Makin. Paranoid schizophrenia (H)  Was using 12.5mg Thorazine for several months, but went back to 25 mg in Sept. \"Too much stress going on\".  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  Several books have not been published yet.  Plan:   -Had discussion with pt and daughter Shante. Shante and her  are supportive and talk with pt daily.  Shante offers no concerns.  Pt and dtr deny memory/cognitive problems.    - chlorproMAZINE (THORAZINE) 25 mg q hs.  -f/u 6 mos or prn.      2. High risk medication use  The risks and benefits of medication discussed with the pt and daughter including metabolic s/e's, TD, CV risk. Metabolic conditions treated by PCP.  Plan:   Today's DISCUS score = 0   Labs reviewed:  -CBC, BMP, WNL (10/1/22)         History of Present Illness:   Neisha Malcolm is a 82 year old female with history of paranoid schizophrenia here for follow-up visit.  Daughter Shante is with her.  Pt reports she is doing well, mood is stable, sleep/appetite good, denies hallucinations.    Lives at [facility] (Senior living).    Activities: Walks to [employer] daily- [email or web address].Does her own food shopping, takes public transportation. Learning the piano (has keyboard).     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  Pt used to live in [place] (ex  lives there now).  Her son lives in [place].  Daughter Shante works at [employer], (does insurance).  Shante's daughter is 22yo, studying writing at [employer]        ROS: " Denies SI/manic/psychotic sx. See HPI, otherwise negative.    Patient's problem list, medication list, allergies, past medical history, surgical history, family history and social history were reviewed.       Current Outpatient Medications:      Ascorbic Acid (VITAMIN C PO), , Disp: , Rfl:      B Complex Vitamins (VITAMIN B-COMPLEX) TABS, Take 1 tablet by mouth 2 times daily , Disp: , Rfl:      calcium gluconate injectable solution used ORALLY, Take 1 tablet by mouth, Disp: , Rfl:      chlorproMAZINE (THORAZINE) 25 MG tablet, Take 1 tablet (25 mg) by mouth At Bedtime, Disp: 90 tablet, Rfl: 1     Cholecalciferol (VITAMIN D3) 25 MCG (1000 UT) CAPS, Take by mouth 2 times daily, Disp: , Rfl:      fluticasone-salmeterol (ADVAIR) 250-50 MCG/ACT inhaler, Inhale 1 puff into the lungs, Disp: , Rfl:      Multiple Vitamins-Minerals (ONCOVITE) TABS, Take 1 tablet by mouth, Disp: , Rfl:      VITAMIN A PO, , Disp: , Rfl:      ipratropium (ATROVENT) 0.02 % neb solution, Inhale 0.5 mg into the lungs (Patient not taking: Reported on 3/14/2023), Disp: , Rfl:         MSE:      Vital signs:   BP (!) 147/77 (BP Location: Right arm, Patient Position: Sitting, Cuff Size: Adult Regular)   Pulse 83   Wt 59 kg (130 lb)   BMI 23.03 kg/m      Alert & oriented x 3.   Appearance: Appears stated age, casually dressed.  Speech: Normal rate, rhythm and tone.  Gait: Normal.  Musculoskeletal: Normal strength, no abnormal movements.  Mood/Affect: Neutral.  Thought Process: Normal rate, logical.  Thought Content: No suicide or homicide ideation.   Associations: Intact, Taoist based belief vs. delusion that she should not treat glaucoma or cataract.  Perceptions: No hallucinations.  Memory: recent and remote memory intact, not formally tested  Attention span and concentration: normal, not formally tested  Language: Intact.  Fund of Knowledge: Normal.  Insight and Judgement: Fair    Cognitive assessment  MOCA score = 28/30 (9/14/21).            Meaghan Welsh MD        Total time spent on this encounter, on the date of service, including pre-visit review of separately obtained history, face-to-face interaction performing medically appropriate physical exam, patient counseling/education, discussing and educating about the neurobiology, genetics of mental illness, pharmacologic and psycho-social treatment options, the risks, benefits and side effects of medication, medication adherence, importance of therapy, specialty referrals, substance use disorder treatment, interpretation of diagnostic results, patient goals and providing supportive therapy was 54 minutes.    High risk decision making secondary to drug therapy requiring ongoing lab monitoring. Additional tests such as EKG, potential for multiple interactions, requiring special neuroleptic consent and education of the patient.

## 2023-04-26 ENCOUNTER — TELEPHONE (OUTPATIENT)
Dept: BEHAVIORAL HEALTH | Facility: CLINIC | Age: 83
End: 2023-04-26
Payer: MEDICARE

## 2023-04-26 NOTE — TELEPHONE ENCOUNTER
Received call form pt on TC queue who requested to cancel psychiatry appointment for 9/19 at 9:30am. Pt requested not to reschedule.    Speedy DONNELLY   4/26/2023  1211